# Patient Record
Sex: FEMALE | Race: WHITE | NOT HISPANIC OR LATINO | Employment: OTHER | URBAN - METROPOLITAN AREA
[De-identification: names, ages, dates, MRNs, and addresses within clinical notes are randomized per-mention and may not be internally consistent; named-entity substitution may affect disease eponyms.]

---

## 2017-08-31 ENCOUNTER — OFFICE VISIT (OUTPATIENT)
Dept: UROGYNECOLOGY | Facility: CLINIC | Age: 82
End: 2017-08-31
Payer: MEDICARE

## 2017-08-31 VITALS
SYSTOLIC BLOOD PRESSURE: 175 MMHG | BODY MASS INDEX: 20.8 KG/M2 | HEIGHT: 67 IN | DIASTOLIC BLOOD PRESSURE: 89 MMHG | TEMPERATURE: 98 F | WEIGHT: 132.5 LBS | HEART RATE: 82 BPM

## 2017-08-31 DIAGNOSIS — N39.41 URGE INCONTINENCE OF URINE: ICD-10-CM

## 2017-08-31 DIAGNOSIS — L03.315 CELLULITIS OF PERINEUM: ICD-10-CM

## 2017-08-31 DIAGNOSIS — R35.0 URINARY FREQUENCY: ICD-10-CM

## 2017-08-31 DIAGNOSIS — Z46.89 PESSARY MAINTENANCE: ICD-10-CM

## 2017-08-31 DIAGNOSIS — N81.11 MIDLINE CYSTOCELE: Primary | ICD-10-CM

## 2017-08-31 DIAGNOSIS — S30.814A VAGINAL ABRASION, INITIAL ENCOUNTER: ICD-10-CM

## 2017-08-31 LAB
BILIRUB SERPL-MCNC: ABNORMAL MG/DL
BLOOD URINE, POC: 250
COLOR, POC UA: YELLOW
GLUCOSE UR QL STRIP: ABNORMAL
KETONES UR QL STRIP: ABNORMAL
LEUKOCYTE ESTERASE URINE, POC: ABNORMAL
NITRITE, POC UA: ABNORMAL
PH, POC UA: 5
PROTEIN, POC: ABNORMAL
SPECIFIC GRAVITY, POC UA: 1.03
UROBILINOGEN, POC UA: ABNORMAL

## 2017-08-31 PROCEDURE — 99214 OFFICE O/P EST MOD 30 MIN: CPT | Mod: S$PBB,,, | Performed by: NURSE PRACTITIONER

## 2017-08-31 PROCEDURE — 3077F SYST BP >= 140 MM HG: CPT | Mod: ,,, | Performed by: NURSE PRACTITIONER

## 2017-08-31 PROCEDURE — 3079F DIAST BP 80-89 MM HG: CPT | Mod: ,,, | Performed by: NURSE PRACTITIONER

## 2017-08-31 PROCEDURE — 99214 OFFICE O/P EST MOD 30 MIN: CPT | Mod: PBBFAC,PO | Performed by: NURSE PRACTITIONER

## 2017-08-31 PROCEDURE — 99999 PR PBB SHADOW E&M-EST. PATIENT-LVL IV: CPT | Mod: PBBFAC,,, | Performed by: NURSE PRACTITIONER

## 2017-08-31 PROCEDURE — 1126F AMNT PAIN NOTED NONE PRSNT: CPT | Mod: ,,, | Performed by: NURSE PRACTITIONER

## 2017-08-31 PROCEDURE — 1159F MED LIST DOCD IN RCRD: CPT | Mod: ,,, | Performed by: NURSE PRACTITIONER

## 2017-08-31 PROCEDURE — 81002 URINALYSIS NONAUTO W/O SCOPE: CPT | Mod: PBBFAC,PO | Performed by: NURSE PRACTITIONER

## 2017-08-31 RX ORDER — OXYBUTYNIN CHLORIDE 5 MG/1
5 TABLET, EXTENDED RELEASE ORAL DAILY
Qty: 30 TABLET | Refills: 5 | Status: SHIPPED | OUTPATIENT
Start: 2017-08-31 | End: 2017-11-30

## 2017-08-31 RX ORDER — CLINDAMYCIN HYDROCHLORIDE 300 MG/1
300 CAPSULE ORAL 2 TIMES DAILY
Qty: 14 CAPSULE | Refills: 0 | Status: SHIPPED | OUTPATIENT
Start: 2017-08-31 | End: 2017-09-07

## 2017-08-31 NOTE — PROGRESS NOTES
Subjective:       Patient ID: Thu Cr is a 82 y.o. female.    Chief Complaint: Pessary Check    HPI  Thu Cr is a 82 y.o. female who is new to me, presents today for pessary problems.  She had been seen by Dr. Hernandez over a year ago on 03/21/16 and fitted with an estring and a #7 ring pessary.  Since that time she has been seeing a DrCatherine In Christopher that has been cleaning the pessary for her.  The pt is able to remove and replace the pessary herself and for the most part had been on the regiment of removing the pessary about every 4 days.  This appeared to be doing well from October through this July.  Then the Dr. She was seeing felt that the pessary was causing her irritation and wanted the pt to take the pessary out every 2 days.  The pt started doing this and this she might have scratched herself one of the times she was removing the pessary and was told she had a scratch on the right side of the vaginal wall.  The  In christopher suggested that she come to us for followup.  She has also been using the estrace cream instead of the estring because the estring is not covered by insurance.  She continues to have some urinary complaints of frequency and urgency as well.  She feels that she is voiding every 2 hours.  She does have some UUI at times.  She is wanting to try something for her OAB.      Review of Systems   Constitutional: Negative for activity change, fever and unexpected weight change.   HENT: Negative for hearing loss.    Eyes: Negative for visual disturbance.   Respiratory: Negative for shortness of breath and wheezing.    Cardiovascular: Negative for chest pain, palpitations and leg swelling.   Gastrointestinal: Negative for abdominal pain, constipation and diarrhea.   Genitourinary: Positive for frequency, urgency and vaginal bleeding. Negative for dyspareunia, dysuria and vaginal discharge.   Musculoskeletal: Negative for gait problem and neck pain.   Skin: Negative for rash and wound.    Allergic/Immunologic: Negative for immunocompromised state.   Neurological: Negative for tremors, speech difficulty and weakness.   Hematological: Does not bruise/bleed easily.   Psychiatric/Behavioral: Negative for agitation and confusion.       Objective:      Physical Exam   Constitutional: She is oriented to person, place, and time. She appears well-developed and well-nourished. No distress.   HENT:   Head: Normocephalic and atraumatic.   Neck: Neck supple. No thyromegaly present.   Pulmonary/Chest: Effort normal. No respiratory distress.   Abdominal: Soft. There is no tenderness. No hernia.   Musculoskeletal: Normal range of motion.   Neurological: She is alert and oriented to person, place, and time.   Skin: Skin is warm and dry. No rash noted.   Psychiatric: She has a normal mood and affect. Her behavior is normal.     Pelvic Exam:  V: No lesions. No palpable nodes.   Va: minimal thin white discharge.  Vaginal abrasion noted on the left vaginal wall.  Silver nitrate used to cautery.  Dominant midline cystocele Ba=0  Meatus:No caruncle or stenosis  Urethra: Non tender. No suburethral masses.  Cx/Cuff: Normal   Uterus: surgically absent  Ad: No mass or tenderness.  Levators :Symmetrical. Normal tone. Non tender.  BL: Non tender  RV: No hemorrhoids.      Assessment:       1. Midline cystocele    2. Urinary frequency    3. Urge incontinence of urine    4. Cellulitis of perineum    5. Vaginal abrasion, initial encounter    6. Pessary maintenance          Procedure note- #7 ring pessary removed and cleaned with soap and water.  After hibicleanse irrigation of the vagina, since she is ALLERGIC to iodine,  #7 ring pessary was reinserted into the vagina.  Pt ambulated in the room and denies any discomfort.  NP reminded pt that the first 24-48 hours are the most likely time for the pessary to fall out and to monitor the toilet before flushing.  Pt verbalized understanding.      Plan:       Midline cystocele- continue  with ring pessary for now.    Urinary frequency- as noted below  -     POCT urine dipstick without microscope  -     oxybutynin (DITROPAN-XL) 5 MG TR24; Take 1 tablet (5 mg total) by mouth once daily.  Dispense: 30 tablet; Refill: 5    Urge incontinence of urine- trial of ditropan    Cellulitis of perineum- as noted below  -     clindamycin (CLEOCIN) 300 MG capsule; Take 1 capsule (300 mg total) by mouth 2 (two) times daily.  Dispense: 14 capsule; Refill: 0    Vaginal abrasion, initial encounter- pt was not willing to leave the pessary out at this time. Discussed with pt that she needs to leave the pessary out for awhile to help heal the abrasion.  Pt is willing to remove the pessary Sunday night and come to an appointment on Tuesday for evaluation of the abrasion.    Pessary maintenance- monitor for now    RTC Tuesday.

## 2017-09-05 ENCOUNTER — OFFICE VISIT (OUTPATIENT)
Dept: UROGYNECOLOGY | Facility: CLINIC | Age: 82
End: 2017-09-05
Payer: MEDICARE

## 2017-09-05 VITALS
HEART RATE: 83 BPM | DIASTOLIC BLOOD PRESSURE: 94 MMHG | TEMPERATURE: 98 F | BODY MASS INDEX: 20.76 KG/M2 | HEIGHT: 67 IN | SYSTOLIC BLOOD PRESSURE: 154 MMHG | WEIGHT: 132.25 LBS

## 2017-09-05 DIAGNOSIS — L03.314 CELLULITIS OF GROIN: ICD-10-CM

## 2017-09-05 DIAGNOSIS — S30.814D VAGINAL ABRASION, SUBSEQUENT ENCOUNTER: ICD-10-CM

## 2017-09-05 DIAGNOSIS — N81.11 MIDLINE CYSTOCELE: Primary | ICD-10-CM

## 2017-09-05 DIAGNOSIS — N39.41 URGE INCONTINENCE OF URINE: ICD-10-CM

## 2017-09-05 DIAGNOSIS — Z46.89 PESSARY MAINTENANCE: ICD-10-CM

## 2017-09-05 PROCEDURE — 3080F DIAST BP >= 90 MM HG: CPT | Mod: ,,, | Performed by: NURSE PRACTITIONER

## 2017-09-05 PROCEDURE — 99214 OFFICE O/P EST MOD 30 MIN: CPT | Mod: PBBFAC,PO | Performed by: NURSE PRACTITIONER

## 2017-09-05 PROCEDURE — 1125F AMNT PAIN NOTED PAIN PRSNT: CPT | Mod: ,,, | Performed by: NURSE PRACTITIONER

## 2017-09-05 PROCEDURE — 1159F MED LIST DOCD IN RCRD: CPT | Mod: ,,, | Performed by: NURSE PRACTITIONER

## 2017-09-05 PROCEDURE — 99999 PR PBB SHADOW E&M-EST. PATIENT-LVL IV: CPT | Mod: PBBFAC,,, | Performed by: NURSE PRACTITIONER

## 2017-09-05 PROCEDURE — 99213 OFFICE O/P EST LOW 20 MIN: CPT | Mod: S$PBB,,, | Performed by: NURSE PRACTITIONER

## 2017-09-05 PROCEDURE — 3077F SYST BP >= 140 MM HG: CPT | Mod: ,,, | Performed by: NURSE PRACTITIONER

## 2017-09-05 NOTE — PROGRESS NOTES
"Subjective:       Patient ID: Thu Cr is a 82 y.o. female.    Chief Complaint: Pessary Check    HPI  Thu Cr is a 82 y.o. female who presents today for follow up on her vaginal abrasion from last week.  She removed the pessary Sunday evening and has left it out since that time.  She did notice some bleeding with removal, but has not noticed any bleeding since.  She denies any vaginal discharge at this time, but is very uncomfortable with the pessary out.  She is anxious to have the pessary replaced.  She has been taking the ditropan and has not noticed much difference in her urinary symptoms as of yet.  She also has been taking the bactrim and feels that the "bump" in the vaginal area is getting better.  She denies any other new complaints/concerns at this time.    Review of Systems   Constitutional: Negative for activity change, fever and unexpected weight change.   HENT: Negative for hearing loss.    Eyes: Negative for visual disturbance.   Respiratory: Negative for shortness of breath and wheezing.    Cardiovascular: Negative for chest pain, palpitations and leg swelling.   Gastrointestinal: Negative for abdominal pain, constipation and diarrhea.   Genitourinary: Positive for frequency and urgency. Negative for dyspareunia, dysuria, vaginal bleeding and vaginal discharge.   Musculoskeletal: Negative for gait problem and neck pain.   Skin: Negative for rash and wound.   Allergic/Immunologic: Negative for immunocompromised state.   Neurological: Negative for tremors, speech difficulty and weakness.   Hematological: Does not bruise/bleed easily.   Psychiatric/Behavioral: Negative for agitation and confusion.       Objective:      Physical Exam   Constitutional: She is oriented to person, place, and time. She appears well-developed and well-nourished. No distress.   HENT:   Head: Normocephalic and atraumatic.   Neck: Neck supple. No thyromegaly present.   Pulmonary/Chest: Effort normal. No respiratory " distress.   Abdominal: Soft. There is no tenderness. No hernia.   Musculoskeletal: Normal range of motion.   Neurological: She is alert and oriented to person, place, and time.   Skin: Skin is warm and dry. No rash noted.   Psychiatric: She has a normal mood and affect. Her behavior is normal.     Pelvic Exam:  V: No lesions. No palpable nodes.  Very small are of irritation noted on the left perineum near the 5 o'clock position.  Smaller than previous visit.  Va: no discharge or bleeding. No evidence of vaginal abrasion.  Good length.  Dominant cystocele Ba=0  Meatus:No caruncle or stenosis  Urethra: Non tender. No suburethral masses.  Cx/Cuff: Normal   Uterus: surgically absent  Ad: No mass or tenderness.  Levators :Symmetrical. Normal tone. Non tender.  BL: Non tender  RV: No hemorrhoids.      Assessment:       1. Midline cystocele    2. Vaginal abrasion, subsequent encounter    3. Pessary maintenance    4. Urge incontinence of urine    5. Cellulitis of groin          Procedure note- After Hibicleanse irrigation of the vagina since the pt is ALLERGIC to betadine, #7 ring pessary was reinserted into the vagina.  Pt ambulated in the room and denies any discomfort.  NP reminded pt that the first 24-48 hours are the most likely time for the pessary to fall out and to monitor the toilet before flushing.  Pt verbalized understanding.      Plan:       Midline cystocele- continue with ring pessary, discussed increasing the intervals between when the pt removes and replaces the pessary at home.  Encouraged pt to resume every 4 days schedule or can try increasing to 1 x a week.    Vaginal abrasion, subsequent encounter- healed at this time,  monitor    Pessary maintenance- discussed removal and replacement at home.  Also discussed how to use estrace cream    Urge incontinence of urine- continue with ditropan    Cellulitis of groin- finish antibiotics and continue using bactroban ointment for 3 more days.    RTC 3 months

## 2017-11-30 ENCOUNTER — OFFICE VISIT (OUTPATIENT)
Dept: UROGYNECOLOGY | Facility: CLINIC | Age: 82
End: 2017-11-30
Payer: MEDICARE

## 2017-11-30 VITALS
WEIGHT: 130.5 LBS | HEIGHT: 66 IN | SYSTOLIC BLOOD PRESSURE: 159 MMHG | BODY MASS INDEX: 20.97 KG/M2 | TEMPERATURE: 98 F | DIASTOLIC BLOOD PRESSURE: 93 MMHG | HEART RATE: 77 BPM

## 2017-11-30 DIAGNOSIS — N90.89 VULVAR IRRITATION: ICD-10-CM

## 2017-11-30 DIAGNOSIS — N39.41 URGE INCONTINENCE OF URINE: ICD-10-CM

## 2017-11-30 DIAGNOSIS — R35.0 URINARY FREQUENCY: ICD-10-CM

## 2017-11-30 DIAGNOSIS — Z46.89 PESSARY MAINTENANCE: ICD-10-CM

## 2017-11-30 DIAGNOSIS — N81.11 CYSTOCELE, MIDLINE: Primary | ICD-10-CM

## 2017-11-30 PROCEDURE — 99999 PR PBB SHADOW E&M-EST. PATIENT-LVL IV: CPT | Mod: PBBFAC,,, | Performed by: NURSE PRACTITIONER

## 2017-11-30 PROCEDURE — 99214 OFFICE O/P EST MOD 30 MIN: CPT | Mod: PBBFAC,PO | Performed by: NURSE PRACTITIONER

## 2017-11-30 PROCEDURE — 99213 OFFICE O/P EST LOW 20 MIN: CPT | Mod: S$PBB,,, | Performed by: NURSE PRACTITIONER

## 2017-11-30 NOTE — PROGRESS NOTES
Subjective:       Patient ID: Thu Cr is a 82 y.o. female.    Chief Complaint: Pessary Check    HPI  Thu Cr is a 82 y.o. female who presents today for routine pessary maintenance.  She feels that she has done well the past few months.  She decreased the frequency with which she had been removing the pessary at home and is now removing it about 1 x a week -10 days.  She feels that this has helped decrease her feeling of rawness.  She does occasionally have some blood with removal of the pessary, but it is not continuous and is very small amount.  She continues to have an area on the left side of the vaginal lips that is tender and feels raw.  She denies any vaginal discharge or bleeding or prolapse around the pessary.  She had tried the ditropan after her last visit on 09/05/17 but had to stop it after 30 days because she was having so many stomach issues and constipation with the medication.  She did feel that the ditropan was working very well for her urinary symptoms, but since she has stopped the medication she is back her having frequency and leakage.  She is wondering if wearing the pads is contributing to the raw sensation.  Her BM are mostly back to normal at this time.  She is wanting to try something else for her bladder if possible.    Review of Systems   Constitutional: Negative for activity change, fever and unexpected weight change.   HENT: Negative for hearing loss.    Eyes: Negative for visual disturbance.   Respiratory: Negative for shortness of breath and wheezing.    Cardiovascular: Negative for chest pain, palpitations and leg swelling.   Gastrointestinal: Negative for abdominal pain, constipation and diarrhea.   Genitourinary: Positive for frequency, urgency and vaginal pain. Negative for dyspareunia, dysuria, vaginal bleeding and vaginal discharge.   Musculoskeletal: Negative for gait problem and neck pain.   Skin: Negative for rash and wound.   Allergic/Immunologic: Negative for  immunocompromised state.   Neurological: Negative for tremors, speech difficulty and weakness.   Hematological: Does not bruise/bleed easily.   Psychiatric/Behavioral: Negative for agitation and confusion.       Objective:      Physical Exam   Constitutional: She is oriented to person, place, and time. She appears well-developed and well-nourished. No distress.   HENT:   Head: Normocephalic and atraumatic.   Neck: Neck supple. No thyromegaly present.   Pulmonary/Chest: Effort normal. No respiratory distress.   Abdominal: Soft. There is no tenderness. No hernia.   Genitourinary:         Musculoskeletal: Normal range of motion.   Neurological: She is alert and oriented to person, place, and time.   Skin: Skin is warm and dry. No rash noted.   Psychiatric: She has a normal mood and affect. Her behavior is normal.     Pelvic Exam:  V: No lesions. No palpable nodes. Small are of excoriated skin on the left side of the labia minora near the 7 o'clock position.  Va: no discharge or bleeding.  Good length.  Dominant midline cystocele Ba=-1  Meatus:No caruncle or stenosis  Urethra: Non tender. No suburethral masses.  Cx/Cuff: Normal   Uterus: small, non-tender  Ad: No mass or tenderness.  Levators :Symmetrical. Normal tone. Non tender.  BL: Non tender  RV: No hemorrhoids.      Assessment:       1. Cystocele, midline    2. Pessary maintenance    3. Urge incontinence of urine    4. Urinary frequency    5. Vulvar irritation          Procedure note- #7 ring pessary removed and cleaned with soap and water.  After HIBICLEANSE  irrigation of the vagina since the pt is ALLERGIC to BETADINE, #7 ring pessary was reinserted into the vagina.  Pt ambulated in the room and denies any discomfort.  NP reminded pt that the first 24-48 hours are the most likely time for the pessary to fall out and to monitor the toilet before flushing.  Pt verbalized understanding.      Plan:       Cystocele, midline- continue with ring pessary    Pessary  maintenance- as noted above    Urge incontinence of urine- Trial of myrbetriq 25 mg 1 tablet po daily.  If any side effects, stop the medication and call the office.    Urinary frequency- as noted above    Vulvar irritation- may use vaseline on the pads to help with irritation.  NP also recommended used of desitin to the area    RTC 2 months

## 2018-02-20 ENCOUNTER — OFFICE VISIT (OUTPATIENT)
Dept: UROGYNECOLOGY | Facility: CLINIC | Age: 83
End: 2018-02-20
Payer: MEDICARE

## 2018-02-20 VITALS
SYSTOLIC BLOOD PRESSURE: 147 MMHG | BODY MASS INDEX: 21.36 KG/M2 | RESPIRATION RATE: 18 BRPM | DIASTOLIC BLOOD PRESSURE: 89 MMHG | WEIGHT: 132.94 LBS | HEIGHT: 66 IN | HEART RATE: 86 BPM

## 2018-02-20 DIAGNOSIS — N90.89 VULVAR IRRITATION: ICD-10-CM

## 2018-02-20 DIAGNOSIS — R35.0 URINARY FREQUENCY: ICD-10-CM

## 2018-02-20 DIAGNOSIS — L02.214 GROIN ABSCESS: ICD-10-CM

## 2018-02-20 DIAGNOSIS — N81.11 CYSTOCELE, MIDLINE: Primary | ICD-10-CM

## 2018-02-20 DIAGNOSIS — Z46.89 PESSARY MAINTENANCE: ICD-10-CM

## 2018-02-20 DIAGNOSIS — N39.46 MIXED INCONTINENCE URGE AND STRESS: ICD-10-CM

## 2018-02-20 PROCEDURE — 99214 OFFICE O/P EST MOD 30 MIN: CPT | Mod: PBBFAC,PO | Performed by: NURSE PRACTITIONER

## 2018-02-20 PROCEDURE — 99999 PR PBB SHADOW E&M-EST. PATIENT-LVL IV: CPT | Mod: PBBFAC,,, | Performed by: NURSE PRACTITIONER

## 2018-02-20 PROCEDURE — 1159F MED LIST DOCD IN RCRD: CPT | Mod: ,,, | Performed by: NURSE PRACTITIONER

## 2018-02-20 PROCEDURE — 99213 OFFICE O/P EST LOW 20 MIN: CPT | Mod: S$PBB,,, | Performed by: NURSE PRACTITIONER

## 2018-02-20 PROCEDURE — 1126F AMNT PAIN NOTED NONE PRSNT: CPT | Mod: ,,, | Performed by: NURSE PRACTITIONER

## 2018-02-20 RX ORDER — CEPHALEXIN 500 MG/1
500 CAPSULE ORAL EVERY 8 HOURS
Qty: 30 CAPSULE | Refills: 0 | Status: SHIPPED | OUTPATIENT
Start: 2018-02-20 | End: 2018-02-22 | Stop reason: ALTCHOICE

## 2018-02-20 NOTE — PROGRESS NOTES
Subjective:       Patient ID: Thu Cr is a 82 y.o. female.    Chief Complaint: Follow-up pessary     HPI  Thu Cr is a 82 y.o. female who presents today for routine pessary maintenance.  She has been decreasing how often she removes and replaces the pessary herself and now takes it out about every Friday.  This seems to be working well for her.  She is also using estrace cream twice a week and desitin.  This has helped decrease her vulvar irritation.  She tried taking the myrbetriq, but after about 2 weeks she started to have some stomach upset, so she no longer wanted to take this medication.  She was unable to tolerate the ditropan for the same reason.  She currently has frequency every 2 hours during the day and night.  She has both KRISTIN and UUI.  She is not sure what else can be done at this time.  Finally, she has a boil that has appeared on the mons.  She has been soaking in epsom salt BID and also placing bactroban on the site TID.  She has had this spot for about 5 days.    Review of Systems   Constitutional: Negative for activity change, fever and unexpected weight change.   HENT: Negative for hearing loss.    Eyes: Negative for visual disturbance.   Respiratory: Negative for shortness of breath and wheezing.    Cardiovascular: Negative for chest pain, palpitations and leg swelling.   Gastrointestinal: Negative for abdominal pain, constipation and diarrhea.   Genitourinary: Positive for frequency and urgency. Negative for dyspareunia, dysuria, vaginal bleeding and vaginal discharge.   Musculoskeletal: Negative for gait problem and neck pain.   Skin: Negative for rash and wound.   Allergic/Immunologic: Negative for immunocompromised state.   Neurological: Negative for tremors, speech difficulty and weakness.   Hematological: Does not bruise/bleed easily.   Psychiatric/Behavioral: Negative for agitation and confusion.       Objective:      Physical Exam   Constitutional: She is oriented to  person, place, and time. She appears well-developed and well-nourished. No distress.   HENT:   Head: Normocephalic and atraumatic.   Neck: Neck supple. No thyromegaly present.   Pulmonary/Chest: Effort normal. No respiratory distress.   Abdominal: Soft. There is no tenderness. No hernia.   Musculoskeletal: Normal range of motion.   Neurological: She is alert and oriented to person, place, and time.   Skin: Skin is warm and dry. No rash noted.        Small red raised boil. No drainage noted   Psychiatric: She has a normal mood and affect. Her behavior is normal.     Pelvic Exam:  V: . No palpable nodes.  Small boil noted on the mons/lower abdomen.  No discharge or bleeding noted.  Mildly tender to touch  Va: small amount of thin yellow discharge.  Small amount of bleeding with removal of the pessary.  Good length  Dominant midline cystocele Ba=-1  Meatus:No caruncle or stenosis  Urethra: Non tender. No suburethral masses.  Cx/Cuff: Normal   Uterus: small, non-tender  Ad: No mass or tenderness.  Levators :Symmetrical. Normal tone. Non tender.  BL: Non tender  RV: No hemorrhoids.      Assessment:       1. Cystocele, midline    2. Pessary maintenance    3. Urinary frequency    4. Mixed incontinence urge and stress    5. Vulvar irritation    6. Groin abscess          Procedure note- #7 ring pessary removed and cleaned with soap and water.  After Hibicleanse irrigation of the vagina, since the pt is ALLERGIC to IODINE   #7 ring pessary was reinserted into the vagina.  Pt ambulated in the room and denies any discomfort.  NP reminded pt that the first 24-48 hours are the most likely time for the pessary to fall out and to monitor the toilet before flushing.  Pt verbalized understanding.      Plan:       Cystocele, midline- continue with ring pessary    Pessary maintenance- continue with ring pessary    Urinary frequency- discussed with pt that she would need a CMG, but she could consider PTNS, pelvic floor physical therapy  and or botox.  Information given on all options.    Mixed incontinence urge and stress- as noted above    Vulvar irritation- increase the estrace to 3 times a week.  Continue desitin PRN    Groin abscess- continue bactroban ointment and epsom salt soaks.  As noted below.  -     cephALEXin (KEFLEX) 500 MG capsule; Take 1 capsule (500 mg total) by mouth every 8 (eight) hours.  Dispense: 30 capsule; Refill: 0    RTC 2 months

## 2018-02-28 ENCOUNTER — TELEPHONE (OUTPATIENT)
Dept: UROGYNECOLOGY | Facility: CLINIC | Age: 83
End: 2018-02-28

## 2018-02-28 NOTE — TELEPHONE ENCOUNTER
Called and left message that I will inform Danitza Cool about the cost of the estrace and get back to her.

## 2018-02-28 NOTE — TELEPHONE ENCOUNTER
----- Message from Natalia Golden sent at 2/27/2018 10:32 AM CST -----  Contact: self [phone] or [phone]  She is calling to let you know that the estrace costs $60.  Please call her with advice.  Thank you!

## 2018-03-01 NOTE — TELEPHONE ENCOUNTER
If she can afford the $60 I would recommend it.  If she just places a fingertipful to the vaginal area three times a week, that tube should hopefully last about 3 months or longer.  If she can't afford the $60 I can send in premarin to see if this is any cheaper, but I don't know if it will be.  Those are the only two options available commercially.  We can try to contact [facility] to see if they can get it any cheaper.

## 2018-03-06 NOTE — TELEPHONE ENCOUNTER
She feels more comfortable knowing the tube will last 3 months or a little longer and is comfortable with that price now. Is requesting a rx be sent in of Estrace

## 2018-03-07 RX ORDER — ESTRADIOL 0.1 MG/G
CREAM VAGINAL
Qty: 42.5 G | Refills: 3 | Status: SHIPPED | OUTPATIENT
Start: 2018-03-07 | End: 2019-06-27

## 2018-04-19 ENCOUNTER — OFFICE VISIT (OUTPATIENT)
Dept: UROGYNECOLOGY | Facility: CLINIC | Age: 83
End: 2018-04-19
Payer: MEDICARE

## 2018-04-19 VITALS
BODY MASS INDEX: 21.11 KG/M2 | DIASTOLIC BLOOD PRESSURE: 91 MMHG | HEIGHT: 67 IN | WEIGHT: 134.5 LBS | HEART RATE: 89 BPM | SYSTOLIC BLOOD PRESSURE: 156 MMHG

## 2018-04-19 DIAGNOSIS — N39.46 MIXED INCONTINENCE URGE AND STRESS: ICD-10-CM

## 2018-04-19 DIAGNOSIS — S30.814A ABRASION OF VAGINA, INITIAL ENCOUNTER: ICD-10-CM

## 2018-04-19 DIAGNOSIS — N95.2 ATROPHIC VAGINITIS: ICD-10-CM

## 2018-04-19 DIAGNOSIS — R35.0 URINARY FREQUENCY: ICD-10-CM

## 2018-04-19 DIAGNOSIS — N81.11 CYSTOCELE, MIDLINE: Primary | ICD-10-CM

## 2018-04-19 DIAGNOSIS — Z46.89 PESSARY MAINTENANCE: ICD-10-CM

## 2018-04-19 PROCEDURE — 99213 OFFICE O/P EST LOW 20 MIN: CPT | Mod: S$PBB,,, | Performed by: NURSE PRACTITIONER

## 2018-04-19 PROCEDURE — 99999 PR PBB SHADOW E&M-EST. PATIENT-LVL IV: CPT | Mod: PBBFAC,,, | Performed by: NURSE PRACTITIONER

## 2018-04-19 PROCEDURE — 99214 OFFICE O/P EST MOD 30 MIN: CPT | Mod: PBBFAC,PO | Performed by: NURSE PRACTITIONER

## 2018-04-19 RX ORDER — MUPIROCIN 20 MG/G
OINTMENT TOPICAL
COMMUNITY
Start: 2018-04-11 | End: 2018-06-04 | Stop reason: SDUPTHER

## 2018-04-19 RX ORDER — LORAZEPAM 0.5 MG/1
TABLET ORAL
COMMUNITY
Start: 2018-02-23 | End: 2018-07-09 | Stop reason: SDUPTHER

## 2018-04-19 NOTE — PROGRESS NOTES
Subjective:       Patient ID: Thu Cr is a 82 y.o. female.    Chief Complaint: Pessary Check    HPI  Thu Cr is a 82 y.o. female who presents today for routine pessary maintenance.  She is able to remove and replace the pessary herself.  She does this every week.  She does not some bleeding after removal of the pessary for about a day.  She has been using the estrace cream 3 times a week and feels that this has helped some.  She continues to have some urinary frequency/urgency and mixed incontinence.  She has tried both the myrbetriq and ditropan and stopped them because they caused stomach upset.  She is considering trying the ditropan again to see if it will cause the same stomach upset.  She has considered the other options of PTNS, botox or pelvic floor physical therapy and is not sure she wishes to pursue any of these options at this time.    Review of Systems   Constitutional: Negative for activity change, fever and unexpected weight change.   HENT: Negative for hearing loss.    Eyes: Negative for visual disturbance.   Respiratory: Negative for shortness of breath and wheezing.    Cardiovascular: Negative for chest pain, palpitations and leg swelling.   Gastrointestinal: Negative for abdominal pain, constipation and diarrhea.   Genitourinary: Positive for frequency and urgency. Negative for dyspareunia, dysuria, vaginal bleeding and vaginal discharge.   Musculoskeletal: Negative for gait problem and neck pain.   Skin: Negative for rash and wound.   Allergic/Immunologic: Negative for immunocompromised state.   Neurological: Negative for tremors, speech difficulty and weakness.   Hematological: Does not bruise/bleed easily.   Psychiatric/Behavioral: Negative for agitation and confusion.       Objective:      Physical Exam   Constitutional: She is oriented to person, place, and time. She appears well-developed and well-nourished. No distress.   HENT:   Head: Normocephalic and atraumatic.   Neck:  Neck supple. No thyromegaly present.   Pulmonary/Chest: Effort normal. No respiratory distress.   Abdominal: Soft. There is no tenderness. No hernia.   Musculoskeletal: Normal range of motion.   Neurological: She is alert and oriented to person, place, and time.   Skin: Skin is warm and dry. No rash noted.   Psychiatric: She has a normal mood and affect. Her behavior is normal.     Pelvic Exam:  V: No lesions. No palpable nodes.   Va: no discharge.  Active bleeding noted on the anterior vaginal wall.  Area cauterized with silver nitrate.  Pessary left out.  Dominant midline cystocele Ba=0  Meatus:No caruncle or stenosis  Urethra: Non tender. No suburethral masses.  Cx/Cuff: Normal   Uterus: small, non-tender  Ad: No mass or tenderness.  Levators :Symmetrical. Normal tone. Non tender.  BL: Non tender  RV: No hemorrhoids.      Assessment:       1. Cystocele, midline    2. Pessary maintenance    3. Urinary frequency    4. Mixed incontinence urge and stress    5. Atrophic vaginitis    6. Abrasion of vagina, initial encounter          Procedure note- #7 ring pessary removed and cleaned with soap and water.  Pessary sent home with pt.  Pt will replace the pessary in about a weeks time.  Pt is not sure if she will be able to leave the pessary out for a full week because the prolapse becomes too irritated.      Plan:       Cystocele, midline- we will leave the pessary out at this time.    Pessary maintenance- as noted above    Urinary frequency- consider resuming ditropan    Mixed incontinence urge and stress- as noted above    Atrophic vaginitis- continue estrace three times a week.    Abrasion of vagina, initial encounter- area cauterized with silver nitrate.  Pessary left out at this time.  Pt is able to replace the pessary and will do so at home so that she does not have a return office visit since she has a far drive.  If she has any concerns or problems with this she will call the office.    RTC 2 months or  PRN

## 2018-04-26 ENCOUNTER — OFFICE VISIT (OUTPATIENT)
Dept: UROGYNECOLOGY | Facility: CLINIC | Age: 83
End: 2018-04-26
Payer: MEDICARE

## 2018-04-26 VITALS
HEART RATE: 75 BPM | SYSTOLIC BLOOD PRESSURE: 162 MMHG | BODY MASS INDEX: 21.14 KG/M2 | DIASTOLIC BLOOD PRESSURE: 91 MMHG | HEIGHT: 67 IN | WEIGHT: 134.69 LBS

## 2018-04-26 DIAGNOSIS — N95.2 ATROPHIC VAGINITIS: ICD-10-CM

## 2018-04-26 DIAGNOSIS — R35.0 URINARY FREQUENCY: ICD-10-CM

## 2018-04-26 DIAGNOSIS — N81.11 CYSTOCELE, MIDLINE: Primary | ICD-10-CM

## 2018-04-26 DIAGNOSIS — S30.814D ABRASION OF VAGINA, SUBSEQUENT ENCOUNTER: ICD-10-CM

## 2018-04-26 DIAGNOSIS — Z46.89 PESSARY MAINTENANCE: ICD-10-CM

## 2018-04-26 DIAGNOSIS — N39.46 MIXED INCONTINENCE URGE AND STRESS: ICD-10-CM

## 2018-04-26 LAB
BILIRUB SERPL-MCNC: ABNORMAL MG/DL
BLOOD URINE, POC: ABNORMAL
COLOR, POC UA: ABNORMAL
GLUCOSE UR QL STRIP: ABNORMAL
KETONES UR QL STRIP: ABNORMAL
LEUKOCYTE ESTERASE URINE, POC: ABNORMAL
NITRITE, POC UA: ABNORMAL
PH, POC UA: 5
PROTEIN, POC: ABNORMAL
SPECIFIC GRAVITY, POC UA: 1.02
UROBILINOGEN, POC UA: ABNORMAL

## 2018-04-26 PROCEDURE — 99999 PR PBB SHADOW E&M-EST. PATIENT-LVL IV: CPT | Mod: PBBFAC,,, | Performed by: NURSE PRACTITIONER

## 2018-04-26 PROCEDURE — 81002 URINALYSIS NONAUTO W/O SCOPE: CPT | Mod: PBBFAC,PO | Performed by: NURSE PRACTITIONER

## 2018-04-26 PROCEDURE — 99214 OFFICE O/P EST MOD 30 MIN: CPT | Mod: PBBFAC,PO | Performed by: NURSE PRACTITIONER

## 2018-04-26 PROCEDURE — 99213 OFFICE O/P EST LOW 20 MIN: CPT | Mod: S$PBB,,, | Performed by: NURSE PRACTITIONER

## 2018-04-26 NOTE — PROGRESS NOTES
Subjective:       Patient ID: Thu Cr is a 82 y.o. female.    Chief Complaint: Wound Check (pessary was out on last thursday  was placed back in on sunday)    HPI  Thu Cr is a 82 y.o. female who presents today for follow up in regards to her vaginal abrasion.  When she was here last week she had a vaginal abrasion from the pessary.  The pessary was left out at that time to help the area heal.  However, it was very uncomfortable for the patient on Friday and Saturday.  She did not feel that she could void and she had to push the prolapse back up in order to void or have a BM.  She was hoping that taking a bath would help alleviate her discomfort.  When she got in the bathtub she started having a stinging sensation and when she got out of the tub, she noticed a large amount of bleeding on the prolapse.  She ended up placing the pessary herself Sunday at 1 am.  She has not had any bleeding since and the irritative symptoms have improved, but she describes it as just a sore sensation.  She denies any urinary complaints.  She is very upset at this time and does not know what the next best step would be.  She does not wish to continue with the pessary, but she is nervous about going for surgical intervention.  She has too much irritation and difficulties when the pessary is left out.  She would like to speak with Dr. Hernandez about surgery.    Review of Systems   Constitutional: Negative for activity change, fever and unexpected weight change.   HENT: Negative for hearing loss.    Eyes: Negative for visual disturbance.   Respiratory: Negative for shortness of breath and wheezing.    Cardiovascular: Negative for chest pain, palpitations and leg swelling.   Gastrointestinal: Negative for abdominal pain, constipation and diarrhea.   Genitourinary: Positive for difficulty urinating, frequency, urgency, vaginal bleeding and vaginal pain. Negative for dyspareunia, dysuria and vaginal discharge.   Musculoskeletal:  Negative for gait problem and neck pain.   Skin: Negative for rash and wound.   Allergic/Immunologic: Negative for immunocompromised state.   Neurological: Negative for tremors, speech difficulty and weakness.   Hematological: Does not bruise/bleed easily.   Psychiatric/Behavioral: Negative for agitation and confusion.       Objective:      Physical Exam   Constitutional: She is oriented to person, place, and time. She appears well-developed and well-nourished. No distress.   HENT:   Head: Normocephalic and atraumatic.   Neck: Neck supple. No thyromegaly present.   Pulmonary/Chest: Effort normal. No respiratory distress.   Abdominal: Soft. There is no tenderness. No hernia.   Musculoskeletal: Normal range of motion.   Neurological: She is alert and oriented to person, place, and time.   Skin: Skin is warm and dry. No rash noted.   Psychiatric: She has a normal mood and affect. Her behavior is normal.   Slightly tearful and anxious about her options.     Pelvic Exam:  V: No lesions. No palpable nodes.   Va: no discharge.  Large area of irritation noted on the anterior vaginal wall.  No areas of active bleeding noted.  Dominant midline cystocele Ba=0  Meatus:No caruncle or stenosis  Urethra: Non tender. No suburethral masses.  Cx/Cuff: Normal   Uterus: small, non tender, with elevation of the bladder, mild descent to -3  Ad: No mass or tenderness.  Levators :Symmetrical. Normal tone. Non tender.  BL: Non tender  RV: No hemorrhoids.      Assessment:       1. Cystocele, midline    2. Pessary maintenance    3. Abrasion of vagina, subsequent encounter    4. Urinary frequency    5. Mixed incontinence urge and stress    6. Atrophic vaginitis          Procedure note- #7 ring pessary removed and cleaned with soap and water.  After hibicleanse irrigation of the vagina, since the pt is ALLERGIC to IODINE, #7 pessary was reinserted into the vagina.  Pt ambulated in the room and denies any discomfort.  NP reminded pt that the  first 24-48 hours are the most likely time for the pessary to fall out and to monitor the toilet before flushing.  Pt verbalized understanding.      Plan:       Cystocele, midline- continue with ring pessary for now,  Will meet with Dr. Hernandez to discuss her options    Pessary maintenance- as noted above    Abrasion of vagina, subsequent encounter- fair amount of vaginal irritation remains. When the pessary is left out the pt gets irritation and bleeding on the prolapse and more voiding problems so we discussed replacing the pessary at this time.    Urinary frequency- monitor    Mixed incontinence urge and stress- monitor    Atrophic vaginitis- continue with estrace cream    RTC 2 weeks with me for CMG and 3 weeks with Dr. Hernandez to discuss her options.

## 2018-05-10 ENCOUNTER — OFFICE VISIT (OUTPATIENT)
Dept: UROGYNECOLOGY | Facility: CLINIC | Age: 83
End: 2018-05-10
Payer: MEDICARE

## 2018-05-10 VITALS
HEART RATE: 73 BPM | SYSTOLIC BLOOD PRESSURE: 171 MMHG | BODY MASS INDEX: 20.9 KG/M2 | HEIGHT: 67 IN | DIASTOLIC BLOOD PRESSURE: 93 MMHG | WEIGHT: 133.19 LBS

## 2018-05-10 DIAGNOSIS — N39.46 MIXED INCONTINENCE URGE AND STRESS: Primary | ICD-10-CM

## 2018-05-10 DIAGNOSIS — N95.2 ATROPHIC VAGINITIS: ICD-10-CM

## 2018-05-10 DIAGNOSIS — N81.11 CYSTOCELE, MIDLINE: ICD-10-CM

## 2018-05-10 DIAGNOSIS — R35.0 URINARY FREQUENCY: ICD-10-CM

## 2018-05-10 LAB
BILIRUB SERPL-MCNC: NEGATIVE MG/DL
BLOOD URINE, POC: NORMAL
COLOR, POC UA: NORMAL
GLUCOSE UR QL STRIP: NORMAL
KETONES UR QL STRIP: NEGATIVE
LEUKOCYTE ESTERASE URINE, POC: NORMAL
NITRITE, POC UA: NEGATIVE
PH, POC UA: 5
PROTEIN, POC: NORMAL
SPECIFIC GRAVITY, POC UA: 1.03
UROBILINOGEN, POC UA: NORMAL

## 2018-05-10 PROCEDURE — 99214 OFFICE O/P EST MOD 30 MIN: CPT | Mod: 25,S$PBB,, | Performed by: NURSE PRACTITIONER

## 2018-05-10 PROCEDURE — 51725 SIMPLE CYSTOMETROGRAM: CPT | Mod: 26,S$PBB,, | Performed by: NURSE PRACTITIONER

## 2018-05-10 PROCEDURE — 81002 URINALYSIS NONAUTO W/O SCOPE: CPT | Mod: PBBFAC,PO | Performed by: NURSE PRACTITIONER

## 2018-05-10 PROCEDURE — 99999 PR PBB SHADOW E&M-EST. PATIENT-LVL IV: CPT | Mod: PBBFAC,,, | Performed by: NURSE PRACTITIONER

## 2018-05-10 PROCEDURE — 51725 SIMPLE CYSTOMETROGRAM: CPT | Mod: PBBFAC,PO | Performed by: NURSE PRACTITIONER

## 2018-05-10 PROCEDURE — 99214 OFFICE O/P EST MOD 30 MIN: CPT | Mod: PBBFAC,PO,25 | Performed by: NURSE PRACTITIONER

## 2018-05-10 NOTE — PROGRESS NOTES
Subjective:       Patient ID: Thu Cr is a 82 y.o. female.    Chief Complaint: CMG    HPI  Thu Cr is a 82 y.o. female who presents today for CMG.  She has been using a pessary for awhile, but has started developing irritation from the pessary.  When the pessary is left out she is extremely uncomfortable, has difficulty voiding and ends up with irritation on the prolapse because it is so pronounced.  She is not sure if she wants to have surgical correction, but she is no longer happy with the pessary either.  She is meeting with Dr. Hernandez to discuss her options next week and we are performing a CMG at this time to help provide information in regards to the possible surgical intervention.      Review of Systems   Constitutional: Negative for activity change, fever and unexpected weight change.   HENT: Negative for hearing loss.    Eyes: Negative for visual disturbance.   Respiratory: Negative for shortness of breath and wheezing.    Cardiovascular: Negative for chest pain, palpitations and leg swelling.   Gastrointestinal: Negative for abdominal pain, constipation and diarrhea.   Genitourinary: Positive for frequency and urgency. Negative for dyspareunia, dysuria, vaginal bleeding and vaginal discharge.   Musculoskeletal: Negative for gait problem and neck pain.   Skin: Negative for rash and wound.   Allergic/Immunologic: Negative for immunocompromised state.   Neurological: Negative for tremors, speech difficulty and weakness.   Hematological: Does not bruise/bleed easily.   Psychiatric/Behavioral: Negative for agitation and confusion.       Objective:      Physical Exam   Constitutional: She is oriented to person, place, and time. She appears well-developed and well-nourished. No distress.   HENT:   Head: Normocephalic and atraumatic.   Neck: Neck supple. No thyromegaly present.   Pulmonary/Chest: Effort normal. No respiratory distress.   Abdominal: Soft. There is no tenderness. No hernia.    Musculoskeletal: Normal range of motion.   Neurological: She is alert and oriented to person, place, and time.   Skin: Skin is warm and dry. No rash noted.   Psychiatric: She has a normal mood and affect. Her behavior is normal.     Pelvic Exam:  V: No lesions. No palpable nodes. Labia majora and minora slightly irritated.  Va: we are leaving the pessary in place at this time to help minimize irritation.  No bulging around the pessary noted.  Meatus:No caruncle or stenosis  Urethra: Non tender. No suburethral masses.  Uterus: pessary in place  Ad: No mass or tenderness.  BL: Non tender  RV: No hemorrhoids.      Assessment:       1. Mixed incontinence urge and stress    2. Urinary frequency    3. Cystocele, midline    4. Atrophic vaginitis          Procedure note- CMG- Pessary left in place prior to beginning CMG.  After betadine irrigation of the urethra, lidocaine instilled via urojet.  A #8 [place] catheter inserted into the bladder.  30 mls of urine withdrawn.  The catheter was then attached to sterile water and the water was allowed to flow into the bladder.  >40 cm of water closure pressure noted.  The pt was then asked to stand.  First sensation was at approx 250 mls.  Total bladder capacity was approx 400 mls.  The catheter was then withdrawn and pt had small amount of leakage with the removal.    Pt asked to cough multiple timesand had some incontinence.  Pt then allowed to ambulate to the restroom.  Pt had no incontinence while ambulating and waiting for the restroom.  Plan:       Mixed incontinence urge and stress- NP will review CMG results with Dr. Hernandez  -     POCT URINE DIPSTICK WITHOUT MICROSCOPE    Urinary frequency- as noted above    Cystocele, midline- as noted above    Atrophic vaginitis- continue estrace 3 times a week    RTC reg sched appt with Dr. Hernandez

## 2018-05-15 ENCOUNTER — OFFICE VISIT (OUTPATIENT)
Dept: UROGYNECOLOGY | Facility: CLINIC | Age: 83
End: 2018-05-15
Payer: MEDICARE

## 2018-05-15 VITALS
DIASTOLIC BLOOD PRESSURE: 89 MMHG | HEIGHT: 67 IN | SYSTOLIC BLOOD PRESSURE: 164 MMHG | WEIGHT: 132.69 LBS | BODY MASS INDEX: 20.83 KG/M2 | HEART RATE: 75 BPM

## 2018-05-15 DIAGNOSIS — N81.10 CYSTOCELE WITH RECTOCELE: ICD-10-CM

## 2018-05-15 DIAGNOSIS — N39.46 MIXED INCONTINENCE URGE AND STRESS: Primary | ICD-10-CM

## 2018-05-15 DIAGNOSIS — N81.6 CYSTOCELE WITH RECTOCELE: ICD-10-CM

## 2018-05-15 DIAGNOSIS — N81.4 UTERINE PROLAPSE: ICD-10-CM

## 2018-05-15 DIAGNOSIS — R35.0 URINARY FREQUENCY: ICD-10-CM

## 2018-05-15 PROCEDURE — 99213 OFFICE O/P EST LOW 20 MIN: CPT | Mod: PBBFAC,PO | Performed by: OBSTETRICS & GYNECOLOGY

## 2018-05-15 PROCEDURE — 99999 PR PBB SHADOW E&M-EST. PATIENT-LVL III: CPT | Mod: PBBFAC,,, | Performed by: OBSTETRICS & GYNECOLOGY

## 2018-05-15 PROCEDURE — 99213 OFFICE O/P EST LOW 20 MIN: CPT | Mod: S$PBB,,, | Performed by: OBSTETRICS & GYNECOLOGY

## 2018-05-15 NOTE — PROGRESS NOTES
Subjective:     Chief Complaint: discuss surgery  History of Present Illness: Thu Cr is a 82 y.o. female who presents for possible surgery.  she has been using the pessary successfully but sometimes she has to remove it because of irritation and bleeding.  When she wears it she has no discomfort and says she can't notice it's there.  However when she has to remove it she has pain while it out.  She is able to place it herself but only does so every other week.  She's not certain if she wants surgery but she does not want to wait until she stopped all.  She is also bothered by mixed incontinence and question whether this would be improved with surgery    Review of Systems    Constitutional: No acute distress. No weight gain/loss.  Eyes: cataracts  ENT: No headaches.   Respiratory: COPD  Cardiovascular: No chest pain. No edema. Hypertension  Gastrointestinal: GERD  Genitourinary: No vaginal bleeding or discharge.  Integument/Breast: Negative  Hematologic/Lymphatic: No history of anemia.  Musculoskeletal:  back pain. No abdominal pain.  Neurological: No disc problems. No paresthesias.  Behavioral/Psych: No history of depression.   Endocrine: thyroid hormonal replacement.  Allergy/Immune: no recent reactions     Objective:   General Exam:  General appearance: WDNF. NAD.   HEENT: Sarah. EOM's intact.  Neck: Normal thyroid.   Back: No CVA tenderness.  RESP: No SOB.  Breasts: deferred  Abdomen: Benign without localizing signs.  Extremities: No edema. No varices.  Lymphatic: noncontributory  Skin: No rashes. No lesions.  Neurologic: Intact.   Psych: Oriented.       Assessment:   We discussed options including surgery versus daily and removal of  pessary to avoid irritation       Plan:      She will try removing the pessary daily for the next 2-3 weeks well call us and let us know if she wished to proceed to surgery or she is comfortable continuing using the pessary

## 2018-06-04 ENCOUNTER — TELEPHONE (OUTPATIENT)
Dept: UROGYNECOLOGY | Facility: CLINIC | Age: 83
End: 2018-06-04

## 2018-06-04 NOTE — TELEPHONE ENCOUNTER
Spoke with pt.  States doing as advised per Dr. Hernandez , taking pessary out every 3 days and putting back in, no pain but has some bleeding.  Informed can work her in with BEBETO Cool NP, will speak with daughter to see what time is convenient for her to bring her and call back.

## 2018-06-04 NOTE — TELEPHONE ENCOUNTER
----- Message from Kylah Ortega sent at 6/4/2018  8:32 AM CDT -----  Contact: self   Patient want to speak with a nurse regarding passing blood and want to see if she need to come in please call back at [phone] or [phone]

## 2018-06-13 ENCOUNTER — OFFICE VISIT (OUTPATIENT)
Dept: UROGYNECOLOGY | Facility: CLINIC | Age: 83
End: 2018-06-13
Payer: MEDICARE

## 2018-06-13 VITALS
BODY MASS INDEX: 21.07 KG/M2 | DIASTOLIC BLOOD PRESSURE: 98 MMHG | WEIGHT: 134.25 LBS | SYSTOLIC BLOOD PRESSURE: 169 MMHG | HEART RATE: 80 BPM | HEIGHT: 67 IN

## 2018-06-13 DIAGNOSIS — S30.814A ABRASION OF VAGINA, INITIAL ENCOUNTER: ICD-10-CM

## 2018-06-13 DIAGNOSIS — N93.9 VAGINAL BLEEDING: Primary | ICD-10-CM

## 2018-06-13 DIAGNOSIS — N81.89 PELVIC RELAXATION: ICD-10-CM

## 2018-06-13 PROCEDURE — 99213 OFFICE O/P EST LOW 20 MIN: CPT | Mod: PBBFAC,PO | Performed by: OBSTETRICS & GYNECOLOGY

## 2018-06-13 PROCEDURE — 99213 OFFICE O/P EST LOW 20 MIN: CPT | Mod: S$PBB,,, | Performed by: OBSTETRICS & GYNECOLOGY

## 2018-06-13 PROCEDURE — 99999 PR PBB SHADOW E&M-EST. PATIENT-LVL III: CPT | Mod: PBBFAC,,, | Performed by: OBSTETRICS & GYNECOLOGY

## 2018-06-13 NOTE — PROGRESS NOTES
Subjective:     Chief Complaint: vaginal spotting  History of Present Illness: Thu Cr is a 82 y.o. female who presents for vaginal spotting.  She had been removing her ring pessary every other night for a while but then went to 5 days.  She is now noticing some bleeding or spotting was minimal discharge or odor.  She complains of some external changes and is worried that she has staph infection because she had a problem with this in the past.  She has difficulty replacing the pessary sometimes because it's too slippery when she puts jelly on her fingertips.  She was instructed to jelly on the tip of the pessary not her fingertips.  She is concerned about leaving it out because she says it's too uncomfortable when she does not have in place    Review of Systems    Constitutional: No acute distress. No weight gain/loss.  Eyes: cataracts  ENT: No headaches.   Respiratory: COPD  Cardiovascular:  Hypertension  Gastrointestinal: GERD  Genitourinary: No vaginal bleeding or discharge.  Integument/Breast: Negative  Hematologic/Lymphatic: B12 deficiency  Musculoskeletal: chronic back pain. No abdominal pain.  Neurological: No disc problems. No paresthesias.  Behavioral/Psych: No history of depression.   Endocrine: thyroid hormonal replacement.  Allergy/Immune: no recent reactions     Objective:   General Exam:  General appearance: WDNF. NAD.   HEENT: Sarah. EOM's intact.  Neck: Normal thyroid.   Back: No CVA tenderness.  Mild kyphosis  RESP: No SOB.  Breasts: deferred  Abdomen: Benign without localizing signs.  Extremities: A few scattered ecchymoses.  Lymphatic: noncontributory  Skin: No rashes. No lesions.  Neurologic: Intact.   Psych: Oriented.   Pelvic Exam:  V:  No lesions. No palpable nodes.   Va:No d/c bleeding or lesions.   .Meatus:No caruncle or stenosis  Urethra: Non tender. No suburethral masses.  Cx/Cuff: Normal   Uterus: Surgically absent.  Ad: No mass or tenderness.  Levators :Symmetrical. Normal tone.  Non tender.  BL: Non tender  RV: No hemorrhoids.  Assessment:   Small abrasion due to pressure from the ring pessary   Procedure note ; pessary cleaning ; ring pessary was removed without difficulty after irrigation with Hibiclens, there was noted to be slight apical abrasion with no actual ulceration   Plan:      Keep the pessary out for at least 24 hours and then for the next week, remove it every night; if the bleeding resolves will then go to every other night

## 2018-08-20 ENCOUNTER — OFFICE VISIT (OUTPATIENT)
Dept: UROGYNECOLOGY | Facility: CLINIC | Age: 83
End: 2018-08-20
Payer: MEDICARE

## 2018-08-20 VITALS
BODY MASS INDEX: 20.59 KG/M2 | DIASTOLIC BLOOD PRESSURE: 91 MMHG | HEIGHT: 67 IN | WEIGHT: 131.19 LBS | HEART RATE: 79 BPM | SYSTOLIC BLOOD PRESSURE: 158 MMHG

## 2018-08-20 DIAGNOSIS — N76.5 VAGINAL ULCERATION: ICD-10-CM

## 2018-08-20 DIAGNOSIS — R35.0 URINARY FREQUENCY: Primary | ICD-10-CM

## 2018-08-20 DIAGNOSIS — N81.89 PELVIC RELAXATION: ICD-10-CM

## 2018-08-20 DIAGNOSIS — R35.1 NOCTURIA: ICD-10-CM

## 2018-08-20 LAB
BILIRUB SERPL-MCNC: ABNORMAL MG/DL
BLOOD URINE, POC: ABNORMAL
COLOR, POC UA: YELLOW
GLUCOSE UR QL STRIP: ABNORMAL
KETONES UR QL STRIP: ABNORMAL
LEUKOCYTE ESTERASE URINE, POC: ABNORMAL
NITRITE, POC UA: ABNORMAL
PH: 5
PROTEIN, POC: ABNORMAL
SPECIFIC GRAVITY, POC UA: 1.02
UROBILINOGEN, POC UA: ABNORMAL

## 2018-08-20 PROCEDURE — 99999 PR PBB SHADOW E&M-EST. PATIENT-LVL III: CPT | Mod: PBBFAC,,, | Performed by: OBSTETRICS & GYNECOLOGY

## 2018-08-20 PROCEDURE — 81002 URINALYSIS NONAUTO W/O SCOPE: CPT | Mod: PBBFAC,PO | Performed by: OBSTETRICS & GYNECOLOGY

## 2018-08-20 PROCEDURE — 99213 OFFICE O/P EST LOW 20 MIN: CPT | Mod: S$PBB,,, | Performed by: OBSTETRICS & GYNECOLOGY

## 2018-08-20 PROCEDURE — 99213 OFFICE O/P EST LOW 20 MIN: CPT | Mod: PBBFAC,PO | Performed by: OBSTETRICS & GYNECOLOGY

## 2018-08-20 NOTE — PROGRESS NOTES
Subjective:     Chief Complaint:  Pessary management  History of Present Illness: Thu Cr is a 83 y.o. female who presents for pessary management.  She was previously seen for an ulceration.  She began to remove the pessary every night for a week and then every other night.  She occasionally see some spotting but no significant discharge.  She is able to remove it and replace it on her own.  She has some incontinence bedtime when she gets to go to the bathroom and wears pad because of this notices sometimes there is some spotting on pad.  She has had no recent bleeding.  She has had no vaginal infections or UTIs.  Her daughter questioned whether she would ever need surgery for this condition.  The patient would like to continue with the pessary as long as possible    Review of Systems    Constitutional: No acute distress. No weight gain/loss.  Eyes: No vision changes.  ENT: No headaches.   Respiratory: COPD  Cardiovascular: No chest pain. No edema. Hypertension  Gastrointestinal: GERD.   Genitourinary: No vaginal bleeding or discharge.  Integument/Breast: Negative  Hematologic/Lymphatic: No history of anemia.  Musculoskeletal: No back pain. No abdominal pain.  Neurological: No disc problems. No paresthesias.  Behavioral/Psych: No history of depression.   Endocrine: No hormonal replacement.  Allergy/Immune: no recent reactions     Objective:   General Exam:  General appearance: WDNF. NAD.   HEENT: Sarah. EOM's intact.  Neck: Normal thyroid.   Back: No CVA tenderness.  Kyphosis  RESP: No SOB.  Breasts: deferred  Abdomen: Benign without localizing signs.  Extremities:  varices.  Lymphatic: noncontributory  Skin: No rashes. No lesions.  Neurologic: Intact.   Psych: Oriented.   Pelvic Exam:  V:  No lesions. No palpable nodes.  Mild erythema  Va:  Pessary fits well.  Healed abrasions with no active bleeding or ulcerations  .Meatus:No caruncle or stenosis  Urethra: Non tender. No suburethral masses.  Cx/Cuff: Normal    Uterus: Surgically absent.  Ad: No mass or tenderness.  Levators :Symmetrical. Normal tone. Non tender.  BL: Non tender  RV: No hemorrhoids.  Assessment:   Does well with the pessary is long she changes it on a frequent basis       Plan:      Because of her age and the success she has had with the pessary we will continue trying to treat her conservatively

## 2019-01-21 ENCOUNTER — OFFICE VISIT (OUTPATIENT)
Dept: UROGYNECOLOGY | Facility: CLINIC | Age: 84
End: 2019-01-21
Payer: MEDICARE

## 2019-01-21 VITALS
HEIGHT: 67 IN | HEART RATE: 82 BPM | DIASTOLIC BLOOD PRESSURE: 78 MMHG | WEIGHT: 132.69 LBS | SYSTOLIC BLOOD PRESSURE: 138 MMHG | BODY MASS INDEX: 20.83 KG/M2

## 2019-01-21 DIAGNOSIS — N39.8 VOIDING DYSFUNCTION: ICD-10-CM

## 2019-01-21 DIAGNOSIS — N93.9 VAGINAL BLEEDING: Primary | ICD-10-CM

## 2019-01-21 DIAGNOSIS — N89.8 GRANULATION TISSUE OF VAGINA: ICD-10-CM

## 2019-01-21 DIAGNOSIS — N81.89 PELVIC RELAXATION: ICD-10-CM

## 2019-01-21 PROCEDURE — 99213 OFFICE O/P EST LOW 20 MIN: CPT | Mod: S$PBB,,, | Performed by: OBSTETRICS & GYNECOLOGY

## 2019-01-21 PROCEDURE — 99213 OFFICE O/P EST LOW 20 MIN: CPT | Mod: PBBFAC,PO | Performed by: OBSTETRICS & GYNECOLOGY

## 2019-01-21 PROCEDURE — 99999 PR PBB SHADOW E&M-EST. PATIENT-LVL III: ICD-10-PCS | Mod: PBBFAC,,, | Performed by: OBSTETRICS & GYNECOLOGY

## 2019-01-21 PROCEDURE — 99999 PR PBB SHADOW E&M-EST. PATIENT-LVL III: CPT | Mod: PBBFAC,,, | Performed by: OBSTETRICS & GYNECOLOGY

## 2019-01-21 PROCEDURE — 99213 PR OFFICE/OUTPT VISIT, EST, LEVL III, 20-29 MIN: ICD-10-PCS | Mod: S$PBB,,, | Performed by: OBSTETRICS & GYNECOLOGY

## 2019-01-21 NOTE — PROGRESS NOTES
Subjective:     Chief Complaint:  Vaginal bleeding  History of Present Illness: Thu Cr is a 83 y.o. female who presents for vaginal bleeding.  She has a ring pessary and in the past has had some irritation which healed when it was removed.  She removes it every other night but says she has bleeding when she does.  She is very concerned about the possibility of leaving it out because within a day her bladder prolapses so much that she can't urinate.  A gets irritated and sometimes bleeds when she tries to push it back up to void.  She recently had a knee replacement which is limiting her mobility.  She is using topical estrogen cream    Review of Systems    Constitutional: No acute distress. No weight gain/loss.  Eyes:  Cataracts  ENT: No headaches.   Respiratory: No SOB.  Cardiovascular: No chest pain. No edema. Hypertension  Gastrointestinal:  GERD.   Genitourinary:  vaginal bleeding   Integument/Breast: Negative  Hematologic/Lymphatic: No history of anemia.  Musculoskeletal:  Recent knee replacement  Neurological: No major disc problems. No paresthesias.  Behavioral/Psych: No history of depression.   Endocrine:  Thyroid hormonal replacement.  Allergy/Immune: no recent reactions     Objective:   General Exam:  General appearance: WDNF. NAD.   HEENT: Sarah. EOM's intact.  Neck: Normal thyroid.   Back: No CVA tenderness.  RESP: No SOB.  Breasts: deferred  Abdomen: Benign without localizing signs.  Extremities:  Right knee surgery healing well  Lymphatic: noncontributory  Skin:  Very thin but not friable.  Neurologic: Intact.   Psych: Oriented.   Pelvic Exam:  V:  No lesions. No palpable nodes.   Va:No d/c bleeding or lesions.  Diffuse relaxation with apical posterior granulation tissue.  The ring pessary seems to fit well.  Granulation tissue was cauterized and Hibiclens was used to irrigate the vagina  .Meatus:No caruncle or stenosis  Urethra: Non tender. No suburethral masses.  Cx/Cuff: Normal   Uterus:  No  abnormalities palpated  Ad: No mass or tenderness.  Levators :Symmetrical. Normal tone. Non tender.  BL: Non tender  RV:  hemorrhoids.  Assessment:   Granulation tissue due to pressure from the pessary.  She feels comfortable with the pessary and said is quite painful and difficult to void when is out.       Plan:    Encouraged to try to leave it out for at least a week.  She does not think she will be able to so if she cannot and is too uncomfortable with it out, she will return to have a smaller ring pessary placed in the hopes that the granulation tissue will heal.  Otherwise she will return next week hopefully to replace her regular pessary

## 2019-02-04 ENCOUNTER — OFFICE VISIT (OUTPATIENT)
Dept: UROGYNECOLOGY | Facility: CLINIC | Age: 84
End: 2019-02-04
Payer: MEDICARE

## 2019-02-04 VITALS
SYSTOLIC BLOOD PRESSURE: 150 MMHG | BODY MASS INDEX: 20.35 KG/M2 | HEART RATE: 81 BPM | DIASTOLIC BLOOD PRESSURE: 82 MMHG | HEIGHT: 67 IN | WEIGHT: 129.63 LBS

## 2019-02-04 DIAGNOSIS — N81.89 PELVIC RELAXATION: ICD-10-CM

## 2019-02-04 DIAGNOSIS — R35.0 URINARY FREQUENCY: Primary | ICD-10-CM

## 2019-02-04 DIAGNOSIS — N93.9 VAGINAL BLEEDING: ICD-10-CM

## 2019-02-04 DIAGNOSIS — N76.1 SUBACUTE VAGINITIS: ICD-10-CM

## 2019-02-04 PROCEDURE — 99213 OFFICE O/P EST LOW 20 MIN: CPT | Mod: PBBFAC,PO | Performed by: OBSTETRICS & GYNECOLOGY

## 2019-02-04 PROCEDURE — 81002 URINALYSIS NONAUTO W/O SCOPE: CPT | Mod: PBBFAC,PO | Performed by: OBSTETRICS & GYNECOLOGY

## 2019-02-04 PROCEDURE — 99213 OFFICE O/P EST LOW 20 MIN: CPT | Mod: S$PBB,,, | Performed by: OBSTETRICS & GYNECOLOGY

## 2019-02-04 PROCEDURE — 99999 PR PBB SHADOW E&M-EST. PATIENT-LVL III: CPT | Mod: PBBFAC,,, | Performed by: OBSTETRICS & GYNECOLOGY

## 2019-02-04 PROCEDURE — 99213 PR OFFICE/OUTPT VISIT, EST, LEVL III, 20-29 MIN: ICD-10-PCS | Mod: S$PBB,,, | Performed by: OBSTETRICS & GYNECOLOGY

## 2019-02-04 PROCEDURE — 99999 PR PBB SHADOW E&M-EST. PATIENT-LVL III: ICD-10-PCS | Mod: PBBFAC,,, | Performed by: OBSTETRICS & GYNECOLOGY

## 2019-02-04 RX ORDER — METRONIDAZOLE 10 MG/G
GEL TOPICAL
COMMUNITY
Start: 2017-07-26 | End: 2019-06-27

## 2019-02-04 RX ORDER — ZOLPIDEM TARTRATE 10 MG/1
TABLET ORAL
COMMUNITY
Start: 2015-06-04 | End: 2019-05-09 | Stop reason: SDUPTHER

## 2019-02-04 RX ORDER — HYDROCODONE BITARTRATE AND ACETAMINOPHEN 5; 325 MG/1; MG/1
1 TABLET ORAL
COMMUNITY
Start: 2016-10-14 | End: 2019-04-22

## 2019-02-04 RX ORDER — ESTRADIOL 0.1 MG/G
1 CREAM VAGINAL
COMMUNITY
Start: 2017-07-10 | End: 2019-06-27

## 2019-02-04 RX ORDER — LORAZEPAM 0.5 MG/1
TABLET ORAL
COMMUNITY
Start: 2015-03-19 | End: 2019-05-30 | Stop reason: SDUPTHER

## 2019-02-04 RX ORDER — METRONIDAZOLE 7.5 MG/G
1 GEL VAGINAL NIGHTLY
Qty: 70 G | Refills: 2 | Status: SHIPPED | OUTPATIENT
Start: 2019-02-04 | End: 2019-06-27

## 2019-02-04 RX ORDER — CLOBETASOL PROPIONATE 0.5 MG/G
CREAM TOPICAL
COMMUNITY
Start: 2016-09-28 | End: 2019-06-27

## 2019-02-04 RX ORDER — TRIAMCINOLONE ACETONIDE 1 MG/G
CREAM TOPICAL
COMMUNITY
Start: 2017-07-26 | End: 2019-06-27

## 2019-02-04 RX ORDER — PANTOPRAZOLE SODIUM 40 MG/1
TABLET, DELAYED RELEASE ORAL
COMMUNITY
Start: 2015-05-04 | End: 2019-06-27

## 2019-02-04 RX ORDER — ONDANSETRON 4 MG/1
TABLET, ORALLY DISINTEGRATING ORAL
Refills: 0 | COMMUNITY
Start: 2018-11-21 | End: 2019-05-06

## 2019-02-04 RX ORDER — PREDNISONE 20 MG/1
TABLET ORAL
COMMUNITY
Start: 2017-07-24 | End: 2019-05-06

## 2019-02-04 RX ORDER — MELOXICAM 15 MG/1
TABLET ORAL
Refills: 0 | COMMUNITY
Start: 2018-11-21 | End: 2019-05-06

## 2019-02-04 RX ORDER — LEVOTHYROXINE SODIUM 112 UG/1
112 TABLET ORAL
COMMUNITY
Start: 2015-04-13 | End: 2019-11-26 | Stop reason: SDUPTHER

## 2019-02-04 RX ORDER — DICLOFENAC SODIUM 10 MG/G
GEL TOPICAL
COMMUNITY
Start: 2015-07-17 | End: 2019-09-05 | Stop reason: SDUPTHER

## 2019-02-04 RX ORDER — DOXYCYCLINE 100 MG/1
CAPSULE ORAL
Refills: 0 | COMMUNITY
Start: 2018-11-21 | End: 2019-05-06

## 2019-02-04 RX ORDER — METOPROLOL SUCCINATE 25 MG/1
25 TABLET, EXTENDED RELEASE ORAL
COMMUNITY
Start: 2016-08-16 | End: 2019-05-06

## 2019-02-04 RX ORDER — OXYCODONE AND ACETAMINOPHEN 10; 325 MG/1; MG/1
1 TABLET ORAL EVERY 4 HOURS PRN
Refills: 0 | COMMUNITY
Start: 2018-12-12 | End: 2019-04-22

## 2019-02-04 RX ORDER — TIZANIDINE 4 MG/1
TABLET ORAL
COMMUNITY
Start: 2015-02-09 | End: 2019-06-27

## 2019-02-04 RX ORDER — GABAPENTIN 100 MG/1
CAPSULE ORAL
COMMUNITY
Start: 2015-05-04 | End: 2019-05-06

## 2019-02-04 NOTE — PROGRESS NOTES
Subjective:     Chief Complaint:  Vaginal bleeding  History of Present Illness: Thu Cr is a 83 y.o. female who presents for vaginal bleeding secondary to abrasion from pessary.  We removed the pessary in hopes that the abrasion would heal.  She is quite uncomfortable in his difficulty voiding minutes out so she did not leave it out for more than 1 week.  She reinserted but has still had some vaginal bleeding.  We previously discussed a smaller pessary as a possibility    Review of Systems    Constitutional: No acute distress. No weight gain/loss.  Eyes:  Cataracts  ENT: No headaches.   Respiratory: No SOB.  Cardiovascular: No chest pain. No edema. Hypertension  Gastrointestinal:  GERD  Genitourinary: No vaginal bleeding or discharge.  Integument/Breast: Negative  Hematologic/Lymphatic: No history of anemia.  Musculoskeletal:  back pain. No abdominal pain.  Neurological: DDD  Behavioral/Psych: No history of depression.   Endocrine:  Hypothyroid  Allergy/Immune: no recent reactions     Objective:   General Exam:  General appearance: WDNF. NAD.   HEENT: Sarah. EOM's intact.  Neck: Normal thyroid.   Back: No CVA tenderness.  RESP: No SOB.  Breasts: deferred  Abdomen: Benign without localizing signs.  Extremities:  Cellulitis right leg  Lymphatic: noncontributory  Skin: No rashes. No lesions.  Neurologic: Intact.   Psych: Oriented.   Pelvic Exam:  V:  No lesions. No palpable nodes.   Va:  Friable of abrasion at the apex.  We irrigated this with Hibiclens and put a number four ring pessary instead of the 6.  .Meatus:No caruncle or stenosis  Urethra: Non tender. No suburethral masses.  Cx/Cuff: Normal   Uterus:  No masses  Ad: No mass or tenderness.  Levators :Symmetrical. Normal tone. Non tender.  BL: Non tender  RV: No hemorrhoids.  Assessment:   Abrasion not likely to heal with the pessary in place at all times but she does not do very well when it removed       Plan:    Hopefully the smaller pessary will put  less pressure.  She will remove it nightly and for the next 2 weeks use [employer]

## 2019-05-06 ENCOUNTER — OFFICE VISIT (OUTPATIENT)
Dept: UROGYNECOLOGY | Facility: CLINIC | Age: 84
End: 2019-05-06
Payer: MEDICARE

## 2019-05-06 VITALS
HEART RATE: 78 BPM | WEIGHT: 132.69 LBS | SYSTOLIC BLOOD PRESSURE: 135 MMHG | DIASTOLIC BLOOD PRESSURE: 82 MMHG | HEIGHT: 67 IN | BODY MASS INDEX: 20.83 KG/M2

## 2019-05-06 DIAGNOSIS — R35.0 URINARY FREQUENCY: Primary | ICD-10-CM

## 2019-05-06 DIAGNOSIS — N76.5 VAGINAL ULCERATION: ICD-10-CM

## 2019-05-06 DIAGNOSIS — N81.89 PELVIC RELAXATION: ICD-10-CM

## 2019-05-06 LAB
BILIRUB SERPL-MCNC: ABNORMAL MG/DL
BLOOD URINE, POC: 250
COLOR, POC UA: YELLOW
GLUCOSE UR QL STRIP: ABNORMAL
KETONES UR QL STRIP: ABNORMAL
LEUKOCYTE ESTERASE URINE, POC: ABNORMAL
NITRITE, POC UA: ABNORMAL
PH, POC UA: 6
PROTEIN, POC: ABNORMAL
SPECIFIC GRAVITY, POC UA: 1.02
UROBILINOGEN, POC UA: ABNORMAL

## 2019-05-06 PROCEDURE — 99213 OFFICE O/P EST LOW 20 MIN: CPT | Mod: S$PBB,,, | Performed by: OBSTETRICS & GYNECOLOGY

## 2019-05-06 PROCEDURE — 99213 OFFICE O/P EST LOW 20 MIN: CPT | Mod: PBBFAC,PO | Performed by: OBSTETRICS & GYNECOLOGY

## 2019-05-06 PROCEDURE — 99999 PR PBB SHADOW E&M-EST. PATIENT-LVL III: ICD-10-PCS | Mod: PBBFAC,,, | Performed by: OBSTETRICS & GYNECOLOGY

## 2019-05-06 PROCEDURE — 81002 URINALYSIS NONAUTO W/O SCOPE: CPT | Mod: PBBFAC,PO | Performed by: OBSTETRICS & GYNECOLOGY

## 2019-05-06 PROCEDURE — 99999 PR PBB SHADOW E&M-EST. PATIENT-LVL III: CPT | Mod: PBBFAC,,, | Performed by: OBSTETRICS & GYNECOLOGY

## 2019-05-06 PROCEDURE — 99213 PR OFFICE/OUTPT VISIT, EST, LEVL III, 20-29 MIN: ICD-10-PCS | Mod: S$PBB,,, | Performed by: OBSTETRICS & GYNECOLOGY

## 2019-05-06 NOTE — PROGRESS NOTES
Subjective:     Chief Complaint:  Bladder prolapse  History of Present Illness: Thu Cr is a 83 y.o. female who presents for pelvic relaxation.  She had a 6.  Ring pessary and did well for awhile but developed an ulceration some bleeding.  We tried removing the pessary but she was too uncomfortable so short while later she reinserted.  We switched to a smaller, 4.  Ring pessary.  She has been removing it every other day but she notices that the bladder sometimes begins to protrude around it.  She has not tried removing the pessary and replaced it.  She question whether there was a size in between that we could use.  She is not having any bleeding or discharge presently    Review of Systems    Constitutional:  Insomnia  Eyes: No vision changes.  ENT: No headaches.   Respiratory:  COPD.  Cardiovascular: No chest pain. No edema.  Gastrointestinal:  GERD  Genitourinary: No vaginal bleeding or discharge.  Integument/Breast: Negative  Hematologic/Lymphatic: No history of anemia.  Musculoskeletal: OA  Neurological: No known disc problems. No paresthesias.  Behavioral/Psych: No history of depression.   Endocrine:  Thyroid hormonal replacement.  Allergy/Immune: no recent reactions     Objective:   General Exam:  General appearance: WDNF. NAD.   HEENT: Sarah. EOM's intact.  Neck: Normal thyroid.   Back: No CVA tenderness.  RESP: No SOB.  Breasts: deferred  Abdomen: Benign without localizing signs.  Extremities: No edema. No varices.  Lymphatic: noncontributory  Skin: No rashes. No lesions.  Neurologic: Intact.   Psych: Oriented.   Pelvic Exam:  V:  No lesions. No palpable nodes.   Va:  Diffuse relaxation with mild abrasion effect right cuff.  The number for ring pessary seems to fit well but on matching up with a 5.  It seems the same or slightly bigger so there is some discrepancy in the sizing.    .Meatus:No caruncle or stenosis  Urethra: Non tender. No suburethral masses.  Cx/Cuff: Normal   Uterus:  Elevated well  with the pessary  Ad: No mass or tenderness.  Levators :Symmetrical. Normal tone. Non tender.  BL: Non tender  RV: No hemorrhoids.  Assessment:   Rather than 4.  Pessary is seems like she has actually has a 5.  Since she had irritation with the 6, I would not like to increase the size at this time       Plan:      She will remove and clean the pessary at least every other day and if the bladder prolapses around that she will remove it and replace it rather than trying to push the bladder up above the pessary.  If she is unsuccessful with this and she has no further ulcerations, we will try going back to a 6.  Pessary

## 2019-06-27 PROBLEM — H35.30 ARMD (AGE RELATED MACULAR DEGENERATION): Status: ACTIVE | Noted: 2019-06-27

## 2019-07-09 ENCOUNTER — OFFICE VISIT (OUTPATIENT)
Dept: UROGYNECOLOGY | Facility: CLINIC | Age: 84
End: 2019-07-09
Payer: MEDICARE

## 2019-07-09 VITALS
HEART RATE: 76 BPM | DIASTOLIC BLOOD PRESSURE: 76 MMHG | WEIGHT: 132.25 LBS | SYSTOLIC BLOOD PRESSURE: 131 MMHG | BODY MASS INDEX: 20.76 KG/M2 | HEIGHT: 67 IN

## 2019-07-09 DIAGNOSIS — N76.1 SUBACUTE VAGINITIS: ICD-10-CM

## 2019-07-09 DIAGNOSIS — N81.89 PELVIC RELAXATION: Primary | ICD-10-CM

## 2019-07-09 DIAGNOSIS — N76.5 VAGINAL MUCOUS MEMBRANE ULCERATION: ICD-10-CM

## 2019-07-09 PROCEDURE — 57160 PR FIT/INSERT INTRAVAG SUPPORT DEVICE: ICD-10-PCS | Mod: S$PBB,,, | Performed by: OBSTETRICS & GYNECOLOGY

## 2019-07-09 PROCEDURE — 99213 PR OFFICE/OUTPT VISIT, EST, LEVL III, 20-29 MIN: ICD-10-PCS | Mod: 25,S$PBB,, | Performed by: OBSTETRICS & GYNECOLOGY

## 2019-07-09 PROCEDURE — 57160 INSERT PESSARY/OTHER DEVICE: CPT | Mod: S$PBB,,, | Performed by: OBSTETRICS & GYNECOLOGY

## 2019-07-09 PROCEDURE — 99213 OFFICE O/P EST LOW 20 MIN: CPT | Mod: PBBFAC,PO | Performed by: OBSTETRICS & GYNECOLOGY

## 2019-07-09 PROCEDURE — 99213 OFFICE O/P EST LOW 20 MIN: CPT | Mod: 25,S$PBB,, | Performed by: OBSTETRICS & GYNECOLOGY

## 2019-07-09 PROCEDURE — 99999 PR PBB SHADOW E&M-EST. PATIENT-LVL III: ICD-10-PCS | Mod: PBBFAC,,, | Performed by: OBSTETRICS & GYNECOLOGY

## 2019-07-09 PROCEDURE — 99999 PR PBB SHADOW E&M-EST. PATIENT-LVL III: CPT | Mod: PBBFAC,,, | Performed by: OBSTETRICS & GYNECOLOGY

## 2019-07-09 PROCEDURE — 57160 INSERT PESSARY/OTHER DEVICE: CPT | Mod: PBBFAC,PO | Performed by: OBSTETRICS & GYNECOLOGY

## 2019-07-11 ENCOUNTER — TELEPHONE (OUTPATIENT)
Dept: UROGYNECOLOGY | Facility: CLINIC | Age: 84
End: 2019-07-11

## 2019-07-11 NOTE — TELEPHONE ENCOUNTER
Spoke with Mari at [facility] office and informed her she would need to get surgical clearance from patients PCP.

## 2019-07-11 NOTE — TELEPHONE ENCOUNTER
----- Message from Higinio Coe sent at 7/11/2019  8:48 AM CDT -----  Type: Needs Medical Advice    Who Called:  Mari/[facility]    Best Call Back Number: [phone]  Additional Information: Caller states that she would like a callback regarding surgical clearance for the patient.

## 2019-09-05 PROBLEM — Z96.652 STATUS POST LEFT KNEE REPLACEMENT: Status: ACTIVE | Noted: 2019-09-05

## 2019-09-23 ENCOUNTER — OFFICE VISIT (OUTPATIENT)
Dept: UROGYNECOLOGY | Facility: CLINIC | Age: 84
End: 2019-09-23
Payer: MEDICARE

## 2019-09-23 VITALS
BODY MASS INDEX: 20.87 KG/M2 | WEIGHT: 132.94 LBS | SYSTOLIC BLOOD PRESSURE: 130 MMHG | HEART RATE: 77 BPM | DIASTOLIC BLOOD PRESSURE: 80 MMHG | HEIGHT: 67 IN

## 2019-09-23 DIAGNOSIS — R35.0 URINARY FREQUENCY: Primary | ICD-10-CM

## 2019-09-23 DIAGNOSIS — N81.89 PELVIC RELAXATION: ICD-10-CM

## 2019-09-23 DIAGNOSIS — N76.1 CHRONIC VAGINITIS: ICD-10-CM

## 2019-09-23 LAB
BILIRUB SERPL-MCNC: ABNORMAL MG/DL
BLOOD URINE, POC: ABNORMAL
COLOR, POC UA: YELLOW
GLUCOSE UR QL STRIP: ABNORMAL
KETONES UR QL STRIP: ABNORMAL
LEUKOCYTE ESTERASE URINE, POC: ABNORMAL
NITRITE, POC UA: ABNORMAL
PH, POC UA: 5
PROTEIN, POC: ABNORMAL
SPECIFIC GRAVITY, POC UA: 1.03
UROBILINOGEN, POC UA: ABNORMAL

## 2019-09-23 PROCEDURE — 99213 PR OFFICE/OUTPT VISIT, EST, LEVL III, 20-29 MIN: ICD-10-PCS | Mod: S$PBB,,, | Performed by: OBSTETRICS & GYNECOLOGY

## 2019-09-23 PROCEDURE — 99213 OFFICE O/P EST LOW 20 MIN: CPT | Mod: PBBFAC,PO | Performed by: OBSTETRICS & GYNECOLOGY

## 2019-09-23 PROCEDURE — 99999 PR PBB SHADOW E&M-EST. PATIENT-LVL III: ICD-10-PCS | Mod: PBBFAC,,, | Performed by: OBSTETRICS & GYNECOLOGY

## 2019-09-23 PROCEDURE — 99213 OFFICE O/P EST LOW 20 MIN: CPT | Mod: S$PBB,,, | Performed by: OBSTETRICS & GYNECOLOGY

## 2019-09-23 PROCEDURE — 81002 URINALYSIS NONAUTO W/O SCOPE: CPT | Mod: PBBFAC,PO | Performed by: OBSTETRICS & GYNECOLOGY

## 2019-09-23 PROCEDURE — 99999 PR PBB SHADOW E&M-EST. PATIENT-LVL III: CPT | Mod: PBBFAC,,, | Performed by: OBSTETRICS & GYNECOLOGY

## 2019-09-23 NOTE — PROGRESS NOTES
Subjective:     Chief Complaint:  Pessary management  History of Present Illness: Thu Cr is a 84 y.o. female who presents for pessary management.  She has a ring pessary.  She removes in cleaned every other day and has had no significant discharge pain or bleeding.  She says sometimes it is difficult to handle it because of its stiffness.  She was unaware that it collapses  only 1 direction because of the spring inside.  Overall she is pleased with how she is doing with the pessary    Review of Systems    Constitutional: No acute distress. No weight gain/loss.  Eyes:  Macular degeneration  ENT: No headaches.   Respiratory: COPD  Cardiovascular: No chest pain. No edema. Hypertension  Gastrointestinal: GERD  Genitourinary: No vaginal bleeding or discharge.  Integument/Breast: Negative  Hematologic/Lymphatic: No history of anemia.  Musculoskeletal: OA  Neurological: No known disc problems. No paresthesias.  Behavioral/Psych: No history of depression.   Endocrine: thyroid hormonal replacement.  Allergy/Immune: no recent reactions     Objective:   General Exam:  General appearance: WDNF. NAD.   HEENT: Sarah. EOM's intact.  Neck: Normal thyroid.   Back: No CVA tenderness.  RESP: No SOB.  Breasts: deferred  Abdomen: Benign without localizing signs.  Extremities: No edema. No varices.  Lymphatic: noncontributory  Skin: No rashes. No lesions.  Neurologic: Intact.   Psych: Oriented.   Pelvic Exam:  V:  No lesions. No palpable nodes.   Va:  Pessary fits well.  No significant lesions although there is moderate discharge.  The pessary was removed in clean and after Betadine irrigation replaced.  .Meatus:No caruncle or stenosis  Urethra: Non tender. No suburethral masses.  Cx/Cuff: Normal   Uterus: Surgically absent.  Ad: No mass or tenderness.  Levators :Symmetrical. Normal tone. Non tender.  BL: Non tender  RV: No hemorrhoids.  Assessment:   Seems to be doing well as long she removes and replaces the pessary every other  day.  She was shown which direction it collapses so that it will be easier for her to place it and remove it       Plan:      Return in 6 months or p.r.n.

## 2019-10-08 NOTE — TELEPHONE ENCOUNTER
Can we see if she is using the estrace.  I went to refill it and the computer states that they vaginal tablets are recommended instead.

## 2019-10-08 NOTE — TELEPHONE ENCOUNTER
Called and spoke to pot and she has not had it filled in a while and she had only asked the pharmacy how it would coast to fill it and they must had sent a refill request in. Did not want to switch to pills unless the dr's told her to.

## 2019-10-10 RX ORDER — ESTRADIOL 0.1 MG/G
CREAM VAGINAL
Qty: 42.5 G | Refills: 3 | Status: SHIPPED | OUTPATIENT
Start: 2019-10-10 | End: 2021-07-12

## 2020-01-20 ENCOUNTER — TELEPHONE (OUTPATIENT)
Dept: UROGYNECOLOGY | Facility: CLINIC | Age: 85
End: 2020-01-20

## 2020-01-20 NOTE — TELEPHONE ENCOUNTER
----- Message from Silas Boyce sent at 1/20/2020  8:16 AM CST -----  Contact: same  Patient called in and stated she cannot get her pessary in and is in some pain.  Patient would like to see if she can either be seen later today 1/20/2020 or tomorrow 1/21/2020 as she has to get transportation.    Patient call back number is [phone]

## 2020-01-20 NOTE — TELEPHONE ENCOUNTER
Called and spoke to pt and she states that she can't get the pessary in states it feels like it has shifted,scheduled with BEBETO DOMINGUEZ tomorrow at 1100.

## 2020-01-21 ENCOUNTER — OFFICE VISIT (OUTPATIENT)
Dept: UROGYNECOLOGY | Facility: CLINIC | Age: 85
End: 2020-01-21
Payer: MEDICARE

## 2020-01-21 VITALS
WEIGHT: 140.88 LBS | BODY MASS INDEX: 22.11 KG/M2 | HEART RATE: 88 BPM | SYSTOLIC BLOOD PRESSURE: 149 MMHG | DIASTOLIC BLOOD PRESSURE: 90 MMHG | HEIGHT: 67 IN

## 2020-01-21 DIAGNOSIS — N81.89 PELVIC RELAXATION: ICD-10-CM

## 2020-01-21 DIAGNOSIS — Z46.89 PESSARY MAINTENANCE: ICD-10-CM

## 2020-01-21 DIAGNOSIS — R30.0 DYSURIA: ICD-10-CM

## 2020-01-21 DIAGNOSIS — N76.1 CHRONIC VAGINITIS: ICD-10-CM

## 2020-01-21 DIAGNOSIS — R35.0 URINARY FREQUENCY: Primary | ICD-10-CM

## 2020-01-21 PROCEDURE — 1125F PR PAIN SEVERITY QUANTIFIED, PAIN PRESENT: ICD-10-PCS | Mod: ,,, | Performed by: NURSE PRACTITIONER

## 2020-01-21 PROCEDURE — 57160 INSERT PESSARY/OTHER DEVICE: CPT | Mod: PBBFAC,PO | Performed by: NURSE PRACTITIONER

## 2020-01-21 PROCEDURE — 57160 PR FIT/INSERT INTRAVAG SUPPORT DEVICE: ICD-10-PCS | Mod: S$PBB,,, | Performed by: NURSE PRACTITIONER

## 2020-01-21 PROCEDURE — 99214 OFFICE O/P EST MOD 30 MIN: CPT | Mod: 25,S$PBB,, | Performed by: NURSE PRACTITIONER

## 2020-01-21 PROCEDURE — 87086 URINE CULTURE/COLONY COUNT: CPT

## 2020-01-21 PROCEDURE — 99999 PR PBB SHADOW E&M-EST. PATIENT-LVL IV: ICD-10-PCS | Mod: PBBFAC,,, | Performed by: NURSE PRACTITIONER

## 2020-01-21 PROCEDURE — 57160 INSERT PESSARY/OTHER DEVICE: CPT | Mod: S$PBB,,, | Performed by: NURSE PRACTITIONER

## 2020-01-21 PROCEDURE — 99999 PR PBB SHADOW E&M-EST. PATIENT-LVL IV: CPT | Mod: PBBFAC,,, | Performed by: NURSE PRACTITIONER

## 2020-01-21 PROCEDURE — 1125F AMNT PAIN NOTED PAIN PRSNT: CPT | Mod: ,,, | Performed by: NURSE PRACTITIONER

## 2020-01-21 PROCEDURE — 99214 PR OFFICE/OUTPT VISIT, EST, LEVL IV, 30-39 MIN: ICD-10-PCS | Mod: 25,S$PBB,, | Performed by: NURSE PRACTITIONER

## 2020-01-21 PROCEDURE — 51701 INSERT BLADDER CATHETER: CPT | Mod: PBBFAC,PO | Performed by: NURSE PRACTITIONER

## 2020-01-21 PROCEDURE — 81002 URINALYSIS NONAUTO W/O SCOPE: CPT | Mod: PBBFAC,PO | Performed by: NURSE PRACTITIONER

## 2020-01-21 PROCEDURE — 1159F PR MEDICATION LIST DOCUMENTED IN MEDICAL RECORD: ICD-10-PCS | Mod: ,,, | Performed by: NURSE PRACTITIONER

## 2020-01-21 PROCEDURE — 99214 OFFICE O/P EST MOD 30 MIN: CPT | Mod: PBBFAC,PO,25 | Performed by: NURSE PRACTITIONER

## 2020-01-21 PROCEDURE — 1159F MED LIST DOCD IN RCRD: CPT | Mod: ,,, | Performed by: NURSE PRACTITIONER

## 2020-01-21 RX ORDER — ACETAMINOPHEN 325 MG/1
325 TABLET ORAL EVERY 6 HOURS PRN
COMMUNITY

## 2020-01-21 RX ORDER — NITROFURANTOIN (MACROCRYSTALS) 100 MG/1
100 CAPSULE ORAL EVERY 12 HOURS
Qty: 10 CAPSULE | Refills: 0 | Status: SHIPPED | OUTPATIENT
Start: 2020-01-21 | End: 2020-01-26

## 2020-01-21 NOTE — PROGRESS NOTES
Subjective:       Patient ID: Thu Cr is a 84 y.o. female.    Chief Complaint: Pessary Check    HPI  Thu Cr is a 84 y.o. female who presents today for pessary concerns.  She took the pessary out on Saturday night as per her usual routine.  When she tried to replace the pessary she had a lot of difficulty with this and ultimately was unable to replace the pessary.  She tried 4 different times to get the pessary it.  What happened was that she would insert the pessary but she did not feel that she could advance it all the way.  She also felt that it was right by the introitus and that this was rubbing and very uncomfortable.  She is also wondering if she might have a UTI.  She has been having some dysuria and her urine has a strong odor to it.  Pt attempted to give a urine specimen, but missed the collection cup.  She is ok with getting a cath specimen.    Review of Systems   Constitutional: Negative for activity change, fever and unexpected weight change.   HENT: Negative for hearing loss.    Eyes: Negative for visual disturbance.   Respiratory: Negative for shortness of breath and wheezing.    Cardiovascular: Negative for chest pain, palpitations and leg swelling.   Gastrointestinal: Negative for abdominal pain, constipation and diarrhea.   Genitourinary: Positive for dysuria, frequency and urgency. Negative for dyspareunia, vaginal bleeding and vaginal discharge.        Vaginal irritation   Musculoskeletal: Negative for gait problem and neck pain.   Skin: Negative for rash and wound.   Allergic/Immunologic: Negative for immunocompromised state.   Neurological: Negative for tremors, speech difficulty and weakness.   Hematological: Does not bruise/bleed easily.   Psychiatric/Behavioral: Negative for agitation and confusion.       Objective:      Physical Exam   Constitutional: She is oriented to person, place, and time. She appears well-developed and well-nourished. No distress.   HENT:   Head:  Normocephalic and atraumatic.   Neck: Neck supple. No thyromegaly present.   Pulmonary/Chest: Effort normal. No respiratory distress.   Abdominal: Soft. There is tenderness in the right lower quadrant and suprapubic area. No hernia.   Mild abd. tenderness   Musculoskeletal: Normal range of motion.   Neurological: She is alert and oriented to person, place, and time.   Skin: Skin is warm and dry. No rash noted.   Psychiatric: She has a normal mood and affect. Her behavior is normal.     Pelvic Exam:  V: No lesions. No palpable nodes.   Va: small amount of thin white discharge. No bleeding noted.  Dominant midline cystocele Ba=0, diffuse relaxation  Meatus:No caruncle or stenosis  Urethra: Non tender. No suburethral masses.  Cx/Cuff: Normal   Uterus: small, non-tender  Ad: No mass or tenderness.  Levators :Symmetrical. Normal tone. Non tender.  BL: mildly tender  RV: No hemorrhoids.      Assessment:       1. Urinary frequency    2. Pelvic relaxation    3. Pessary maintenance    4. Chronic vaginitis    5. Dysuria          Procedure note- After Hibicleanse irrigation of the vagina, since the pt is ALLERGIC to IODINE, the pt's #6 ring pessary without support was inserted into the vagina.  It was felt that this was slightly too big and it was removed.  A #5 ring pessary without support was inserted into the vagina and the pt felt that this was a better fit and not uncomfortable like the other pessary was.   Pt ambulated in the room and denies any discomfort.  NP reminded pt that the first 24-48 hours are the most likely time for the pessary to fall out and to monitor the toilet before flushing.  Pt verbalized understanding.      Procedure note- After Hibicleanse irrigation of the urethra, since the pt is ALLERGIC to IODINE,  lidocaine instilled via urojet.   A #14 Turkmen red rubber tip catheter was inserted into the bladder.  10 mls of residual urine noted.     Plan:       Urinary frequency- we will send her urine for  culture as noted below  -     POCT urine dipstick without microscope  -     Urine culture    Pelvic relaxation- trial of #5 ring pessary without support    Pessary maintenance- as noted above    Chronic vaginitis- monitor    Dysuria- as noted below pending urine culture results.  -     nitrofurantoin (MACRODANTIN) 100 MG capsule; Take 1 capsule (100 mg total) by mouth every 12 (twelve) hours. for 5 days  Dispense: 10 capsule; Refill: 0    RTC 2 months

## 2020-01-22 LAB — BACTERIA UR CULT: NO GROWTH
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
## 2020-02-22 PROBLEM — S52.501A CLOSED FRACTURE OF DISTAL END OF RIGHT RADIUS: Status: ACTIVE | Noted: 2020-02-22

## 2020-02-27 ENCOUNTER — TELEPHONE (OUTPATIENT)
Dept: UROGYNECOLOGY | Facility: CLINIC | Age: 85
End: 2020-02-27

## 2020-02-27 NOTE — TELEPHONE ENCOUNTER
I'm sorry to hear that she was injured.  I am ok with the home health nurse placing the pessary, but I'm not sure that they will do this.  We can call the company to verify what the home health nurse can and can't do.

## 2020-02-27 NOTE — TELEPHONE ENCOUNTER
----- Message from Ashanti Oakes sent at 2/27/2020  2:13 PM CST -----  Contact: Blanca Johns (Daughter in Law)  Type: Needs Medical Advice    Who Called:  Blanca Johns (Daughter in Law)  Best Call Back Number:   Additional Information: Calling to speak with the nurse about getting set up for home health.

## 2020-02-27 NOTE — TELEPHONE ENCOUNTER
Fell and has a large cast on her right arm and was wanting to get a nurse from [facility] to come in and place the pessary in for her. Daughter in law states that she can take it out but can not put it back in. Informed that she can leave the pessary in for 3 months , but was told by daughter in law that Mrs Andino has to have her pessary removed more often because the vaginal area tends to break down when she leaves it in. This is why she is asking for home health to come in and help her insert it. Informed that I don't believe that [facility] will do this. But will discuss with The nurse practioner.  Daughter in law states that Danitza Cool  Can call Thu.

## 2020-02-28 NOTE — TELEPHONE ENCOUNTER
Called [facility] [phone] to see if there is someone there who can place pessary in for patient, left message on recorder. Called patient and informed

## 2020-02-28 NOTE — TELEPHONE ENCOUNTER
Spoke to patient and she was wondering would it hurt for her to leave it in for 5-6 nights, informed that I did not think it would be a problem but will check with Dr Hernandez on Monday. patient states she can not leave the pessary in for months , due to she gets infections, informed I would let Dr Hernandez know and see what he suggests. Patient just wants guidance.

## 2020-03-02 PROBLEM — G47.00 INSOMNIA: Status: ACTIVE | Noted: 2020-03-02

## 2020-03-02 NOTE — TELEPHONE ENCOUNTER
"Left message" that as long as she tolerates it well it would not be an issue" on phone. To call back  If any other questions.            "

## 2020-04-09 ENCOUNTER — TELEPHONE (OUTPATIENT)
Dept: UROGYNECOLOGY | Facility: CLINIC | Age: 85
End: 2020-04-09

## 2020-04-09 NOTE — TELEPHONE ENCOUNTER
----- Message from Susanna Mccabe sent at 4/9/2020  9:12 AM CDT -----  Contact: CC/Homehealth nurse  Type: Needs Medical Advice  Who Called:  MERY  Best Call Back Number: [phone]  Additional Information: CC would like to speak with the nurse about patients insertion of pessary , patient is not able to do this not staying in place keeps falling.  Please call to advise as soon as possible and CC will stay at the home until office calls.  Thanks!

## 2020-04-14 ENCOUNTER — OFFICE VISIT (OUTPATIENT)
Dept: UROGYNECOLOGY | Facility: CLINIC | Age: 85
End: 2020-04-14
Payer: MEDICARE

## 2020-04-14 VITALS
BODY MASS INDEX: 21.9 KG/M2 | DIASTOLIC BLOOD PRESSURE: 89 MMHG | WEIGHT: 139.56 LBS | HEART RATE: 81 BPM | SYSTOLIC BLOOD PRESSURE: 160 MMHG | HEIGHT: 67 IN

## 2020-04-14 DIAGNOSIS — R35.0 URINARY FREQUENCY: Primary | ICD-10-CM

## 2020-04-14 DIAGNOSIS — Z46.89 PESSARY MAINTENANCE: ICD-10-CM

## 2020-04-14 DIAGNOSIS — N81.11 CYSTOCELE, MIDLINE: ICD-10-CM

## 2020-04-14 DIAGNOSIS — N81.89 PELVIC RELAXATION: ICD-10-CM

## 2020-04-14 PROCEDURE — 57160 PR FIT/INSERT INTRAVAG SUPPORT DEVICE: ICD-10-PCS | Mod: S$PBB,,, | Performed by: NURSE PRACTITIONER

## 2020-04-14 PROCEDURE — 99214 OFFICE O/P EST MOD 30 MIN: CPT | Mod: PBBFAC,PO,25 | Performed by: NURSE PRACTITIONER

## 2020-04-14 PROCEDURE — 57160 INSERT PESSARY/OTHER DEVICE: CPT | Mod: PBBFAC,PO | Performed by: NURSE PRACTITIONER

## 2020-04-14 PROCEDURE — 99214 PR OFFICE/OUTPT VISIT, EST, LEVL IV, 30-39 MIN: ICD-10-PCS | Mod: S$PBB,25,, | Performed by: NURSE PRACTITIONER

## 2020-04-14 PROCEDURE — 99214 OFFICE O/P EST MOD 30 MIN: CPT | Mod: S$PBB,25,, | Performed by: NURSE PRACTITIONER

## 2020-04-14 PROCEDURE — 99999 PR PBB SHADOW E&M-EST. PATIENT-LVL IV: ICD-10-PCS | Mod: PBBFAC,,, | Performed by: NURSE PRACTITIONER

## 2020-04-14 PROCEDURE — 99999 PR PBB SHADOW E&M-EST. PATIENT-LVL IV: CPT | Mod: PBBFAC,,, | Performed by: NURSE PRACTITIONER

## 2020-04-14 PROCEDURE — 57160 INSERT PESSARY/OTHER DEVICE: CPT | Mod: S$PBB,,, | Performed by: NURSE PRACTITIONER

## 2020-04-14 RX ORDER — HYDROCODONE BITARTRATE AND ACETAMINOPHEN 5; 325 MG/1; MG/1
TABLET ORAL
COMMUNITY
Start: 2020-03-06 | End: 2020-08-20

## 2020-04-14 NOTE — PROGRESS NOTES
Subjective:       Patient ID: Thu Cr is a 84 y.o. female.    Chief Complaint: Pessary Check    HPI  Thu Cr is a 84 y.o. female who presents today for pessary concerns.  When she was last seen in our office on 01/21/20, she was having problems replacing her pessary.  At that visit we tried a slightly smaller #5 ring without support.  She was able to remove and replace this herself and seemed to feel that it was doing ok.  She ended up falling and fracturing her right wrist on 02/22/20.  Since that time, she has been able to remove the pessary, but she is not able to replace it.  She has been having a home health nurse come to her house once a week.  The pt removes the pessary the night before and then the nurse replaces the pessary the next day.  This has been going fine until last week.  At last weeks visit, the nurse had difficulty replacing the pessary.  She ended up trying a few different pessaries that the pt had at her home before she was able to get the current pessary in.  The pt is not sure what pessary the nurse was able to place.  The pt did have a little bit of spotting after the nurses visit last Thursday, but has not had any since then.  The pt also has been feeling that the bulge is coming around the pessary at times.  She is not sure what should be done, but she is interested in looking into surgery after her wrist has healed.  She denies any real changes in her urinary symptoms at this time.  She has a lot of frequency but this is not new for her.  She denies any other acute complaints/concerns and is ready to proceed with the pessary.    Review of Systems   Constitutional: Negative for activity change, fever and unexpected weight change.   HENT: Negative for hearing loss.    Eyes: Negative for visual disturbance.   Respiratory: Negative for shortness of breath and wheezing.    Cardiovascular: Negative for chest pain, palpitations and leg swelling.   Gastrointestinal: Negative for  abdominal pain, constipation and diarrhea.   Genitourinary: Positive for frequency. Negative for dyspareunia, dysuria, urgency, vaginal bleeding and vaginal discharge.   Musculoskeletal: Negative for gait problem and neck pain.   Skin: Negative for rash and wound.   Allergic/Immunologic: Negative for immunocompromised state.   Neurological: Negative for tremors, speech difficulty and weakness.   Hematological: Does not bruise/bleed easily.   Psychiatric/Behavioral: Negative for agitation and confusion.       Objective:      Physical Exam   Constitutional: She is oriented to person, place, and time. She appears well-developed and well-nourished. No distress.   HENT:   Head: Normocephalic and atraumatic.   Neck: Neck supple. No thyromegaly present.   Pulmonary/Chest: Effort normal. No respiratory distress.   Abdominal: Soft. There is no tenderness. No hernia.   Musculoskeletal: Normal range of motion.   Neurological: She is alert and oriented to person, place, and time.   Skin: Skin is warm and dry. No rash noted.   Psychiatric: She has a normal mood and affect. Her behavior is normal.     Pelvic Exam:  V: No lesions. No palpable nodes.   Va: small amount of thin yellow discharge.  No bleeding noted.  Good length. Dominant midline cystocele Ba=0. Diffuse relaxation.  Meatus:No caruncle or stenosis  Urethra: Non tender. No suburethral masses.  Cx/Cuff: Normal   Uterus: small, non-tender  Ad: No mass or tenderness.  Levators :Symmetrical. Normal tone. Non tender.  BL: Non tender  RV: No hemorrhoids.      Assessment:       1. Urinary frequency    2. Pelvic relaxation    3. Cystocele, midline    4. Pessary maintenance          Procedure note- #5 ring pessary WITHOUTsupport removed and cleaned with soap and water.  After Hibicleanse irrigation of the vagina, since the pt is ALLERGIC to IODINE,  A new #5 ring pessary WITH support was inserted into the vagina.  Pt ambulated in the room and denies any discomfort.  NP reminded  pt that the first 24-48 hours are the most likely time for the pessary to fall out and to monitor the toilet before flushing.  Pt verbalized understanding.      Plan:       Urinary frequency- monitor    Pelvic relaxation- trial of #5 ring WITH support pessary    Cystocele, midline- as noted above    Pessary maintenance- as noted above    RTC 2 months

## 2020-07-27 ENCOUNTER — OFFICE VISIT (OUTPATIENT)
Dept: UROGYNECOLOGY | Facility: CLINIC | Age: 85
End: 2020-07-27
Payer: MEDICARE

## 2020-07-27 VITALS
DIASTOLIC BLOOD PRESSURE: 88 MMHG | WEIGHT: 140.19 LBS | SYSTOLIC BLOOD PRESSURE: 167 MMHG | HEIGHT: 67 IN | BODY MASS INDEX: 22 KG/M2 | HEART RATE: 75 BPM

## 2020-07-27 DIAGNOSIS — R35.0 URINARY FREQUENCY: ICD-10-CM

## 2020-07-27 DIAGNOSIS — N39.46 MIXED INCONTINENCE URGE AND STRESS: ICD-10-CM

## 2020-07-27 DIAGNOSIS — Z01.812 PRE-OPERATIVE LABORATORY EXAMINATION: ICD-10-CM

## 2020-07-27 DIAGNOSIS — N81.89 PELVIC RELAXATION: Primary | ICD-10-CM

## 2020-07-27 LAB
BILIRUB SERPL-MCNC: ABNORMAL MG/DL
BLOOD URINE, POC: ABNORMAL
CLARITY, POC UA: CLEAR
COLOR, POC UA: YELLOW
GLUCOSE UR QL STRIP: ABNORMAL
KETONES UR QL STRIP: ABNORMAL
LEUKOCYTE ESTERASE URINE, POC: ABNORMAL
NITRITE, POC UA: ABNORMAL
PH, POC UA: 5
PROTEIN, POC: ABNORMAL
SPECIFIC GRAVITY, POC UA: 1.03
UROBILINOGEN, POC UA: ABNORMAL

## 2020-07-27 PROCEDURE — 99999 PR PBB SHADOW E&M-EST. PATIENT-LVL V: ICD-10-PCS | Mod: PBBFAC,,, | Performed by: OBSTETRICS & GYNECOLOGY

## 2020-07-27 PROCEDURE — 81002 URINALYSIS NONAUTO W/O SCOPE: CPT | Mod: PBBFAC,PO | Performed by: OBSTETRICS & GYNECOLOGY

## 2020-07-27 PROCEDURE — 99215 OFFICE O/P EST HI 40 MIN: CPT | Mod: PBBFAC,PO | Performed by: OBSTETRICS & GYNECOLOGY

## 2020-07-27 PROCEDURE — 99213 OFFICE O/P EST LOW 20 MIN: CPT | Mod: S$PBB,,, | Performed by: OBSTETRICS & GYNECOLOGY

## 2020-07-27 PROCEDURE — 99999 PR PBB SHADOW E&M-EST. PATIENT-LVL V: CPT | Mod: PBBFAC,,, | Performed by: OBSTETRICS & GYNECOLOGY

## 2020-07-27 PROCEDURE — 99213 PR OFFICE/OUTPT VISIT, EST, LEVL III, 20-29 MIN: ICD-10-PCS | Mod: S$PBB,,, | Performed by: OBSTETRICS & GYNECOLOGY

## 2020-07-27 RX ORDER — SODIUM CHLORIDE 9 MG/ML
INJECTION, SOLUTION INTRAVENOUS CONTINUOUS
Status: CANCELLED | OUTPATIENT
Start: 2020-07-27

## 2020-07-27 NOTE — PROGRESS NOTES
Subjective:     Chief Complaint:  Prolapse  History of Present Illness: Thu Cr is a 85 y.o. female who presents for worsening vaginal prolapse.  She has been using a pessary for about 5 years and had done fairly well although lately she is having more difficulty removing placing it.  She recently broke her wrist and this makes it very difficult for her to remove it.  Once a week she tries to remove it and home health nurse comes and and replaces it the next day.  She is now desirous of definitive surgical therapy.  She is not  her sexually active and is desirous of a colpocleisis.  She has mixed incontinence and in the past question whether surgery would help this as well.  She is not presently on any medications for overactive bladder    Review of Systems    Constitutional:  Insomnia  Eyes:  Cataracts and macular degeneration  ENT: No headaches.   Respiratory: No SOB.  Cardiovascular: No chest pain. No edema. Hypertension  Gastrointestinal:  GERD  Genitourinary: No vaginal bleeding or discharge.  Integument/Breast:  Recent skin cancer removal  Hematologic/Lymphatic: No history of anemia.  Musculoskeletal:  Fractured wrist  Neurological: No known disc problems. No paresthesias.  Behavioral/Psych: No history of depression.   Endocrine:  Thyroid hormonal replacement.  Allergy/Immune: no recent reactions     Objective:   General Exam:  General appearance:  Elderly female in NAD.   HEENT: Sarah. EOM's intact.  Neck: Normal thyroid.   Back: No CVA tenderness.    RESP: No SOB.  Breasts: deferred  Abdomen: Benign without localizing signs.  Extremities:  varices.  Bandage on left arm  Lymphatic: noncontributory  Skin: No rashes. No lesions.  Neurologic:  Grossly intact.   Psych: Oriented.   Pelvic Exam:  V:  No lesions. No palpable nodes.   Va:  Ring pessary fits well.  Diffuse relaxation with moderate discharge from pessary  .Meatus:No caruncle or stenosis  Urethra: Non tender. No suburethral  masses.  Cx/Cuff: No masses or friability  Uterus:  No masses or tenderness  Ad: No mass or tenderness.  Levators :Symmetrical. Normal tone. Non tender.  BL: Non tender  RV:  External hemorrhoids.  Assessment:   Worsening prolapse and more difficulty dealing with the pessary because of age and infirmity. she would prefer surgery at this time and is amenable to colpocleisis and she is not sexually active.  She has some mixed incontinence and will need to be counseled on the likely continuation of these symptoms postoperatively.  We will do a bladder scan and consider CMG preoperatively to determine if she would be a candidate at all for a suburethral sling but I am hesitant because of her age and medical condition.  Since she still has a uterus we will likely do endometrial sampling at the time of the procedure to avoid hysterectomy and prolonged anesthetic       Plan:    Will schedule for colpocleisis pending surgical clearance  Bladder scan and possible simple CMG in the preop visit

## 2020-08-13 ENCOUNTER — TELEPHONE (OUTPATIENT)
Dept: UROGYNECOLOGY | Facility: CLINIC | Age: 85
End: 2020-08-13

## 2020-08-13 NOTE — TELEPHONE ENCOUNTER
----- Message from Kash Wu sent at 8/13/2020 12:01 PM CDT -----  Type: Needs Medical Advice    Who Called:  Patient  Best Call Back Number: [phone]  Additional Information: Patient would like to discuss upcoming pre-procedure. Please call to advise. Thanks!

## 2020-08-13 NOTE — TELEPHONE ENCOUNTER
Spoke with pt. She was making sure that seeing her pc on the 20th was not cutting it too close to have her sx clearance for her per op on the 24th. I advised her that that was  Fine and suggested her to bring sx clearance in hand

## 2020-08-20 PROBLEM — M50.30 DEGENERATIVE DISC DISEASE, CERVICAL: Status: ACTIVE | Noted: 2020-08-20

## 2020-08-24 ENCOUNTER — OFFICE VISIT (OUTPATIENT)
Dept: UROGYNECOLOGY | Facility: CLINIC | Age: 85
End: 2020-08-24
Payer: MEDICARE

## 2020-08-24 VITALS
BODY MASS INDEX: 21.87 KG/M2 | WEIGHT: 139.31 LBS | HEIGHT: 67 IN | SYSTOLIC BLOOD PRESSURE: 170 MMHG | DIASTOLIC BLOOD PRESSURE: 91 MMHG | HEART RATE: 85 BPM

## 2020-08-24 DIAGNOSIS — R35.0 URINARY FREQUENCY: Primary | ICD-10-CM

## 2020-08-24 DIAGNOSIS — N81.4 UTERINE PROLAPSE: ICD-10-CM

## 2020-08-24 DIAGNOSIS — N81.11 CYSTOCELE, MIDLINE: ICD-10-CM

## 2020-08-24 LAB
BILIRUB SERPL-MCNC: ABNORMAL MG/DL
BLOOD URINE, POC: ABNORMAL
CLARITY, POC UA: CLEAR
COLOR, POC UA: YELLOW
GLUCOSE UR QL STRIP: ABNORMAL
KETONES UR QL STRIP: ABNORMAL
LEUKOCYTE ESTERASE URINE, POC: ABNORMAL
NITRITE, POC UA: ABNORMAL
PH, POC UA: 5
POC RESIDUAL URINE VOLUME: 14 ML (ref 0–100)
PROTEIN, POC: ABNORMAL
SPECIFIC GRAVITY, POC UA: 1020
UROBILINOGEN, POC UA: ABNORMAL

## 2020-08-24 PROCEDURE — 99213 OFFICE O/P EST LOW 20 MIN: CPT | Mod: S$PBB,,, | Performed by: OBSTETRICS & GYNECOLOGY

## 2020-08-24 PROCEDURE — 99213 PR OFFICE/OUTPT VISIT, EST, LEVL III, 20-29 MIN: ICD-10-PCS | Mod: S$PBB,,, | Performed by: OBSTETRICS & GYNECOLOGY

## 2020-08-24 PROCEDURE — 81002 URINALYSIS NONAUTO W/O SCOPE: CPT | Mod: PBBFAC,PO | Performed by: OBSTETRICS & GYNECOLOGY

## 2020-08-24 PROCEDURE — 51798 US URINE CAPACITY MEASURE: CPT | Mod: PBBFAC,PO | Performed by: OBSTETRICS & GYNECOLOGY

## 2020-08-24 PROCEDURE — 99999 PR PBB SHADOW E&M-EST. PATIENT-LVL III: CPT | Mod: PBBFAC,,, | Performed by: OBSTETRICS & GYNECOLOGY

## 2020-08-24 PROCEDURE — 99999 PR PBB SHADOW E&M-EST. PATIENT-LVL III: ICD-10-PCS | Mod: PBBFAC,,, | Performed by: OBSTETRICS & GYNECOLOGY

## 2020-08-24 PROCEDURE — 99213 OFFICE O/P EST LOW 20 MIN: CPT | Mod: PBBFAC,PO | Performed by: OBSTETRICS & GYNECOLOGY

## 2020-08-24 RX ORDER — METRONIDAZOLE 7.5 MG/G
1 GEL VAGINAL 2 TIMES DAILY
Qty: 70 G | Refills: 2 | Status: ON HOLD | OUTPATIENT
Start: 2020-08-24 | End: 2020-09-18 | Stop reason: HOSPADM

## 2020-08-24 NOTE — PROGRESS NOTES
Subjective:     Chief Complaint:  Pelvic relaxation and prolapse  History of Present Illness: Thu Cr is a 85 y.o. female who presents for upcoming surgical procedure for prolapse/ pelvic relaxation.  She has used to pessary for number of years but is finding that is more difficult to deal with as she gets older and is less effective in holding back her prolapse.  Recently she finds it uncomfortable to wear.  She changes it once a week but has noticed some spotting when she removes it  At this point she would prefer to go directly to surgery and is desirous of colpocleisis to minimize operative time and risk.  She does have mixed incontinence and understands that procedure is not done to treat this particular problem.  She still has a uterus and understands that we will sample the uterine lining and if any abnormalities are present it could require further treatment.  Urinalysis today is normal.  She had COVID about 4 weeks ago but is presently asymptomatic and has been cleared for surgery by     Review of Systems    Constitutional:  Has had falls  Eyes: No vision changes.  ENT: No headaches.   Respiratory:  Recent COVID-19 infection  Cardiovascular:  Hyperlipidemia Hypertension  Gastrointestinal:  GERD  Genitourinary: No vaginal bleeding or discharge.  Integument/Breast: Negative  Hematologic/Lymphatic:  B12 deficiency  Musculoskeletal:  Dupuytren's contraction.  Knee replacement  Neurological:   DDD  Behavioral/Psych: No history of depression.   Endocrine:  Thyroid dysfunction  Allergy/Immune: no recent reactions     Objective:   General Exam:  General appearance: WDNF. NAD.   HEENT: Sarah. EOM's intact.  Neck: Normal thyroid.   Back: No CVA tenderness.  RESP: No SOB.  Breasts: deferred  Abdomen: Benign without localizing signs.  Extremities: No edema. No varices.  Lymphatic: noncontributory  Skin: No rashes. No lesions.  Neurologic: Intact.   Psych: Oriented.   Pelvic Exam:  V:  No lesions. No  palpable nodes.   Va:  Abrasion with some granulation tissue on the left vaginal wall near the cuff.  Silver nitrate was applied and a smaller, 4.  Pessary was placed.  .Meatus:No caruncle or stenosis  Urethra: Non tender. No suburethral masses.  Cx/Cuff: No abnormalities no  Uterus:  Procidentia with removal of pessary  Ad: No mass or tenderness.  Levators :Symmetrical. Normal tone.  Poor contractions Non tender.  BL: Non tender  RV:  External hemorrhoids.  Assessment:   Abrasion in granulation tissue from pessary.  We will need to postpone the surgery but she says she cannot tolerate going without the pessary so we will try MetroGel along with a smaller, 4.  Ring pessary to see if this will improve the condition.  She understands the importance of minimizing anesthesia time and complexity of the surgery     Procedure note; bladder scan; residual was 14 mL  Plan:    Reschedule surgery to include uterine sampling and colpocleisis  MetroGel

## 2020-08-28 DIAGNOSIS — Z01.818 PRE-OP TESTING: Primary | ICD-10-CM

## 2020-09-02 ENCOUNTER — DOCUMENT SCAN (OUTPATIENT)
Dept: HOME HEALTH SERVICES | Facility: HOSPITAL | Age: 85
End: 2020-09-02
Payer: MEDICARE

## 2020-09-09 ENCOUNTER — TELEPHONE (OUTPATIENT)
Dept: UROGYNECOLOGY | Facility: CLINIC | Age: 85
End: 2020-09-09

## 2020-09-09 NOTE — TELEPHONE ENCOUNTER
----- Message from Margot Rowley sent at 9/9/2020 10:52 AM CDT -----  Contact: self  Type: Needs Medical Advice  Who Called:  patient    Best Call Back Number: [phone]  Additional Information: requesting a return call concerning her pre op appointments

## 2020-09-11 NOTE — PRE-PROCEDURE INSTRUCTIONS
Patient was called to give information regarding medication and  pre-procedure instructions. Information left on voicemail. Pt instructed to call back to verify receiving information.

## 2020-09-14 ENCOUNTER — HOSPITAL ENCOUNTER (OUTPATIENT)
Dept: PREADMISSION TESTING | Facility: HOSPITAL | Age: 85
Discharge: HOME | End: 2020-09-14
Attending: OBSTETRICS & GYNECOLOGY
Payer: MEDICARE

## 2020-09-14 ENCOUNTER — OFFICE VISIT (OUTPATIENT)
Dept: UROGYNECOLOGY | Facility: CLINIC | Age: 85
End: 2020-09-14
Payer: MEDICARE

## 2020-09-14 VITALS
BODY MASS INDEX: 21.73 KG/M2 | DIASTOLIC BLOOD PRESSURE: 81 MMHG | HEART RATE: 77 BPM | WEIGHT: 138.44 LBS | HEIGHT: 67 IN | SYSTOLIC BLOOD PRESSURE: 145 MMHG

## 2020-09-14 DIAGNOSIS — N39.3 URINARY, INCONTINENCE, STRESS FEMALE: Primary | ICD-10-CM

## 2020-09-14 DIAGNOSIS — N81.89 PELVIC RELAXATION: ICD-10-CM

## 2020-09-14 DIAGNOSIS — N81.3 UTERINE PROCIDENTIA: Primary | ICD-10-CM

## 2020-09-14 PROCEDURE — 99900104 DSU ONLY-NO CHARGE-EA ADD'L HR (STAT)

## 2020-09-14 PROCEDURE — 99900103 DSU ONLY-NO CHARGE-INITIAL HR (STAT)

## 2020-09-14 PROCEDURE — 99999 PR PBB SHADOW E&M-EST. PATIENT-LVL III: CPT | Mod: PBBFAC,,, | Performed by: OBSTETRICS & GYNECOLOGY

## 2020-09-14 PROCEDURE — 99213 PR OFFICE/OUTPT VISIT, EST, LEVL III, 20-29 MIN: ICD-10-PCS | Mod: S$PBB,,, | Performed by: OBSTETRICS & GYNECOLOGY

## 2020-09-14 PROCEDURE — 99999 PR PBB SHADOW E&M-EST. PATIENT-LVL III: ICD-10-PCS | Mod: PBBFAC,,, | Performed by: OBSTETRICS & GYNECOLOGY

## 2020-09-14 PROCEDURE — 99213 OFFICE O/P EST LOW 20 MIN: CPT | Mod: PBBFAC,PO | Performed by: OBSTETRICS & GYNECOLOGY

## 2020-09-14 PROCEDURE — 99213 OFFICE O/P EST LOW 20 MIN: CPT | Mod: S$PBB,,, | Performed by: OBSTETRICS & GYNECOLOGY

## 2020-09-14 NOTE — PROGRESS NOTES
Subjective:     Chief Complaint:  Uterine prolapse  History of Present Illness: Thu Cr is a 85 y.o. female who presents for uterine procidentia and diffuse relaxation.  She has been wearing a pessary but is now desirous of definitive surgical therapy.  She had an abrasion granulation tissue from the pessary and we tried to remove it but she was unable to leave it out because of the discomfort of prolapse.  She has mixed incontinence and understands that that will need to be treated postop.  She understands that although she has had no abnormal Pap smears of other uterine problems we will do uterine sampling prior to doing colpocleisis which is being done to shorten the operative time and anesthesia time    Review of Systems    Constitutional:  Has surgical clearance  Eyes:  Cataracts and macular degeneration  ENT: No headaches.   Respiratory:  Previously had COVID/ recovered well  Cardiovascular:  Hyperlipidemia Hypertension  Gastrointestinal:  GERD  Genitourinary: No vaginal bleeding or discharge.  Integument/Breast: Negative  Hematologic/Lymphatic:  B12 deficient  Musculoskeletal:  Knee replaced  Neurological: DDD  Behavioral/Psych:  Insomnia  Endocrine:  Hypothyroidism   Allergy/Immune: no recent reactions     Objective:   General Exam:  General appearance: WDNF. NAD.   HEENT: Sarah. EOM's intact.  Neck: Normal thyroid.   Back: No CVA tenderness.  RESP: No SOB.  Breasts: deferred  Abdomen: Benign without localizing signs.  Extremities:  varices.  Lymphatic: noncontributory  Skin:  Fragile skin  Neurologic: Intact.   Psych: Oriented and alert  Pelvic Exam:  V:  No lesions. No palpable nodes.   Va:  Diffuse laxity and relaxation..  Granulation tissue and abrasion  .Meatus:  Mild posterior eversion  Urethra: Non tender. No suburethral masses.  Cx/Cuff: No lesions  Uterus:  Procidentia  Ad: No mass or tenderness.  Levators :Symmetrical.  Poor contraction Normal tone. Non tender.  BL: Non tender  RV:  External  hemorrhoids.  Assessment:   Procidentia  diffuse relaxation.  Discomfort and abrasions and granulation tissue from pessaries  Mixed incontinence with dominant urge and urge incontinence   Procedure, risks and complications discussed with patient and her daughter    Plan:    Endometrial sampling.  Colpocleisis

## 2020-09-14 NOTE — DISCHARGE INSTRUCTIONS
To confirm, Your doctor has instructed you that surgery is scheduled for: 9/17/20      Please report to [facility], Registration the morning of surgery. You must check-in and receive a wristband before going to your procedure.    Pre-Op will call the afternoon prior to surgery between 1:00 and 6:00 PM with the final arrival time.  Phone number: [phone]    PLEASE NOTE:  The surgery schedule has many variables which may affect the time of your surgery case.  Family members should be available if your surgery time changes.  Plan to be here the day of your procedure between 4-6 hours.    MEDICATIONS:  TAKE ONLY THESE MEDICATIONS WITH A SMALL SIP OF WATER THE MORNING OF YOUR PROCEDURE:    Fluoxetine, levothyroxine, Pantoprazole    DO NOT TAKE THESE MEDICATIONS 5-7 DAYS PRIOR to your procedure or per your surgeon's request: ASPIRIN, ALEVE, ADVIL, IBUPROFEN, FISH OIL VITAMIN E,9Multivitamin) (I caps) HERBALS  (May take Tylenol)                                                ONLY if you are prescribed any types of blood thinners such as:  Aspirin, Coumadin, Plavix, Pradaxa, Xarelto, Aggrenox, Effient, Eliquis, Savasya, Brilinta, or any other, ask your surgeon whether you should stop taking them and how long before surgery you should stop.  You may also need to verify with the prescribing physician if it is ok to stop your medication.      INSTRUCTIONS IMPORTANT!!  · Do not eat or drink anything between midnight and the time of your procedure- this includes gum, mints, and candy.  · Do not smoke or drink alcoholic beverages 24 hours prior to your procedure.  · Shower the night before AND the morning of your procedure with a Chlorhexidine wash such as Hibiclens or Dial antibacterial soap from the neck down.  Do not get it on your face or in your eyes.  You may use your own shampoo and face wash. This helps your skin to be as bacteria free as possible.    · If you wear contact lenses, dentures,  hearing aids or glasses, bring a container to put them in during surgery and give to a family member for safe keeping.  Please leave all jewelry, piercing's and valuables at home.   · DO NOT remove hair from the surgery site.  Do not shave the incision site unless you are given specific instructions to do so.    · ONLY if you have been diagnosed with sleep apnea please bring your C-PAP machine.  · ONLY if you wear home oxygen please bring your portable oxygen tank the day of your procedure.  · ONLY if you have a history of OPEN HEART SURGERY you will need a clearance from your Cardiologist per Anesthesia.      · ONLY for patients requiring bowel prep, written instructions will be given by your doctor's office.  · ONLY if you have a neuro stimulator, please bring the controller with you the morning of surgery  · ONLY if a type and screen test is needed before surgery, please return:  · If your doctor has scheduled you for an overnight stay, bring a small overnight bag with any personal items you need.  · Make arrangements in advance for transportation home by a responsible adult.  It is not safe to drive a vehicle during the 24 hours after anesthesia.     · ONLY ONE VISITOR PER PATIENT IS ALLOWED TO COME IN THE HOSPITAL THE DAY OF PROCEDURE.   · Visiting hours are 8:00AM-6:00PM. For the safety of all patients, visitors under the age of 12 are not allowed above the first floor of the hospital.    · All [facility] facilities and properties are tobacco free.  Smoking is NOT allowed.       If you have any questions about these instructions, call Pre-Op Admit  Nursing at [phone] or the Pre-Op Day Surgery Unit at [phone].

## 2020-09-16 ENCOUNTER — ANESTHESIA EVENT (OUTPATIENT)
Dept: SURGERY | Facility: HOSPITAL | Age: 85
End: 2020-09-16
Payer: MEDICARE

## 2020-09-16 RX ORDER — SODIUM CHLORIDE, SODIUM LACTATE, POTASSIUM CHLORIDE, CALCIUM CHLORIDE 600; 310; 30; 20 MG/100ML; MG/100ML; MG/100ML; MG/100ML
INJECTION, SOLUTION INTRAVENOUS CONTINUOUS
Status: CANCELLED | OUTPATIENT
Start: 2020-09-16

## 2020-09-17 ENCOUNTER — ANESTHESIA (OUTPATIENT)
Dept: SURGERY | Facility: HOSPITAL | Age: 85
End: 2020-09-17
Payer: MEDICARE

## 2020-09-17 ENCOUNTER — HOSPITAL ENCOUNTER (OUTPATIENT)
Facility: HOSPITAL | Age: 85
Discharge: HOME | End: 2020-09-18
Attending: OBSTETRICS & GYNECOLOGY | Admitting: OBSTETRICS & GYNECOLOGY
Payer: MEDICARE

## 2020-09-17 DIAGNOSIS — R35.0 URINARY FREQUENCY: ICD-10-CM
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
## 2020-09-17 DIAGNOSIS — N39.3 URINARY, INCONTINENCE, STRESS FEMALE: ICD-10-CM

## 2020-09-17 DIAGNOSIS — M50.30 DEGENERATIVE DISC DISEASE, CERVICAL: ICD-10-CM

## 2020-09-17 DIAGNOSIS — H26.9 BILATERAL CATARACTS: ICD-10-CM

## 2020-09-17 DIAGNOSIS — N81.89 PELVIC RELAXATION: ICD-10-CM

## 2020-09-17 DIAGNOSIS — H35.30 ARMD (AGE RELATED MACULAR DEGENERATION): ICD-10-CM

## 2020-09-17 DIAGNOSIS — N81.3 UTERINE PROCIDENTIA: Primary | ICD-10-CM

## 2020-09-17 DIAGNOSIS — E78.5 HLD (HYPERLIPIDEMIA): ICD-10-CM

## 2020-09-17 LAB
ANION GAP SERPL CALC-SCNC: 8 MMOL/L (ref 8–16)
BUN SERPL-MCNC: 11 MG/DL (ref 8–23)
CALCIUM SERPL-MCNC: 9.2 MG/DL (ref 8.7–10.5)
CHLORIDE SERPL-SCNC: 105 MMOL/L (ref 95–110)
CO2 SERPL-SCNC: 28 MMOL/L (ref 23–29)
CREAT SERPL-MCNC: 0.7 MG/DL (ref 0.5–1.4)
EST. GFR  (AFRICAN AMERICAN): >60 ML/MIN/1.73 M^2
EST. GFR  (NON AFRICAN AMERICAN): >60 ML/MIN/1.73 M^2
GLUCOSE SERPL-MCNC: 88 MG/DL (ref 70–110)
POTASSIUM SERPL-SCNC: 4.1 MMOL/L (ref 3.5–5.1)
SODIUM SERPL-SCNC: 141 MMOL/L (ref 136–145)

## 2020-09-17 PROCEDURE — 36000706: Performed by: OBSTETRICS & GYNECOLOGY

## 2020-09-17 PROCEDURE — 99900103 DSU ONLY-NO CHARGE-INITIAL HR (STAT): Performed by: OBSTETRICS & GYNECOLOGY

## 2020-09-17 PROCEDURE — 25000003 PHARM REV CODE 250: Performed by: ANESTHESIOLOGY

## 2020-09-17 PROCEDURE — 88305 TISSUE EXAM BY PATHOLOGIST: CPT | Mod: 26,,, | Performed by: PATHOLOGY

## 2020-09-17 PROCEDURE — 94761 N-INVAS EAR/PLS OXIMETRY MLT: CPT

## 2020-09-17 PROCEDURE — 57260 CMBN ANT PST COLPRHY: CPT | Mod: ,,, | Performed by: OBSTETRICS & GYNECOLOGY

## 2020-09-17 PROCEDURE — D9220A PRA ANESTHESIA: Mod: ANES,,, | Performed by: ANESTHESIOLOGY

## 2020-09-17 PROCEDURE — 63600175 PHARM REV CODE 636 W HCPCS: Performed by: ANESTHESIOLOGY

## 2020-09-17 PROCEDURE — 88305 TISSUE EXAM BY PATHOLOGIST: ICD-10-PCS | Mod: 26,,, | Performed by: PATHOLOGY

## 2020-09-17 PROCEDURE — 71000033 HC RECOVERY, INTIAL HOUR: Performed by: OBSTETRICS & GYNECOLOGY

## 2020-09-17 PROCEDURE — D9220A PRA ANESTHESIA: ICD-10-PCS | Mod: CRNA,,, | Performed by: NURSE ANESTHETIST, CERTIFIED REGISTERED

## 2020-09-17 PROCEDURE — 80048 BASIC METABOLIC PNL TOTAL CA: CPT

## 2020-09-17 PROCEDURE — 63600175 PHARM REV CODE 636 W HCPCS: Performed by: NURSE ANESTHETIST, CERTIFIED REGISTERED

## 2020-09-17 PROCEDURE — 57260 PR COMBINED ANT/POST COLPORRHAPHY: ICD-10-PCS | Mod: ,,, | Performed by: OBSTETRICS & GYNECOLOGY

## 2020-09-17 PROCEDURE — 37000008 HC ANESTHESIA 1ST 15 MINUTES: Performed by: OBSTETRICS & GYNECOLOGY

## 2020-09-17 PROCEDURE — 25000003 PHARM REV CODE 250: Performed by: NURSE ANESTHETIST, CERTIFIED REGISTERED

## 2020-09-17 PROCEDURE — D9220A PRA ANESTHESIA: ICD-10-PCS | Mod: ANES,,, | Performed by: ANESTHESIOLOGY

## 2020-09-17 PROCEDURE — 27000221 HC OXYGEN, UP TO 24 HOURS

## 2020-09-17 PROCEDURE — 36415 COLL VENOUS BLD VENIPUNCTURE: CPT

## 2020-09-17 PROCEDURE — 37000009 HC ANESTHESIA EA ADD 15 MINS: Performed by: OBSTETRICS & GYNECOLOGY

## 2020-09-17 PROCEDURE — 88305 TISSUE EXAM BY PATHOLOGIST: CPT | Performed by: PATHOLOGY

## 2020-09-17 PROCEDURE — 25000003 PHARM REV CODE 250: Performed by: OBSTETRICS & GYNECOLOGY

## 2020-09-17 PROCEDURE — 36000707: Performed by: OBSTETRICS & GYNECOLOGY

## 2020-09-17 PROCEDURE — 94799 UNLISTED PULMONARY SVC/PX: CPT

## 2020-09-17 PROCEDURE — C2627 CATH, SUPRAPUBIC/CYSTOSCOPIC: HCPCS | Performed by: OBSTETRICS & GYNECOLOGY

## 2020-09-17 PROCEDURE — 71000039 HC RECOVERY, EACH ADD'L HOUR: Performed by: OBSTETRICS & GYNECOLOGY

## 2020-09-17 PROCEDURE — 63600175 PHARM REV CODE 636 W HCPCS: Performed by: OBSTETRICS & GYNECOLOGY

## 2020-09-17 PROCEDURE — 99900104 DSU ONLY-NO CHARGE-EA ADD'L HR (STAT): Performed by: OBSTETRICS & GYNECOLOGY

## 2020-09-17 PROCEDURE — D9220A PRA ANESTHESIA: Mod: CRNA,,, | Performed by: NURSE ANESTHETIST, CERTIFIED REGISTERED

## 2020-09-17 RX ORDER — FENTANYL CITRATE 50 UG/ML
25 INJECTION, SOLUTION INTRAMUSCULAR; INTRAVENOUS EVERY 5 MIN PRN
Status: COMPLETED | OUTPATIENT
Start: 2020-09-17 | End: 2020-09-17

## 2020-09-17 RX ORDER — GABAPENTIN 300 MG/1
300 CAPSULE ORAL 2 TIMES DAILY
Status: COMPLETED | OUTPATIENT
Start: 2020-09-17 | End: 2020-09-18

## 2020-09-17 RX ORDER — ROCURONIUM BROMIDE 10 MG/ML
INJECTION, SOLUTION INTRAVENOUS
Status: DISCONTINUED | OUTPATIENT
Start: 2020-09-17 | End: 2020-09-17

## 2020-09-17 RX ORDER — ACETAMINOPHEN 10 MG/ML
INJECTION, SOLUTION INTRAVENOUS
Status: DISCONTINUED | OUTPATIENT
Start: 2020-09-17 | End: 2020-09-17

## 2020-09-17 RX ORDER — MORPHINE SULFATE 2 MG/ML
4 INJECTION, SOLUTION INTRAMUSCULAR; INTRAVENOUS
Status: DISCONTINUED | OUTPATIENT
Start: 2020-09-17 | End: 2020-09-18 | Stop reason: HOSPADM

## 2020-09-17 RX ORDER — SUCRALFATE 1 G/10ML
1 SUSPENSION ORAL EVERY 6 HOURS
Status: DISCONTINUED | OUTPATIENT
Start: 2020-09-17 | End: 2020-09-18 | Stop reason: HOSPADM

## 2020-09-17 RX ORDER — ONDANSETRON 2 MG/ML
4 INJECTION INTRAMUSCULAR; INTRAVENOUS ONCE AS NEEDED
Status: DISCONTINUED | OUTPATIENT
Start: 2020-09-17 | End: 2020-09-17 | Stop reason: HOSPADM

## 2020-09-17 RX ORDER — LIDOCAINE HYDROCHLORIDE 10 MG/ML
1 INJECTION, SOLUTION EPIDURAL; INFILTRATION; INTRACAUDAL; PERINEURAL ONCE
Status: COMPLETED | OUTPATIENT
Start: 2020-09-17 | End: 2020-09-17

## 2020-09-17 RX ORDER — LIDOCAINE HYDROCHLORIDE 20 MG/ML
JELLY TOPICAL
Status: DISCONTINUED | OUTPATIENT
Start: 2020-09-17 | End: 2020-09-17 | Stop reason: HOSPADM

## 2020-09-17 RX ORDER — MAG HYDROX/ALUMINUM HYD/SIMETH 200-200-20
30 SUSPENSION, ORAL (FINAL DOSE FORM) ORAL
Status: DISCONTINUED | OUTPATIENT
Start: 2020-09-17 | End: 2020-09-18 | Stop reason: HOSPADM

## 2020-09-17 RX ORDER — DOCUSATE SODIUM 100 MG/1
100 CAPSULE, LIQUID FILLED ORAL 2 TIMES DAILY
Status: DISCONTINUED | OUTPATIENT
Start: 2020-09-17 | End: 2020-09-18 | Stop reason: HOSPADM

## 2020-09-17 RX ORDER — FENTANYL CITRATE 50 UG/ML
INJECTION, SOLUTION INTRAMUSCULAR; INTRAVENOUS
Status: DISCONTINUED | OUTPATIENT
Start: 2020-09-17 | End: 2020-09-17

## 2020-09-17 RX ORDER — SUCCINYLCHOLINE CHLORIDE 20 MG/ML
INJECTION INTRAMUSCULAR; INTRAVENOUS
Status: DISCONTINUED | OUTPATIENT
Start: 2020-09-17 | End: 2020-09-17

## 2020-09-17 RX ORDER — LIDOCAINE HCL/PF 100 MG/5ML
SYRINGE (ML) INTRAVENOUS
Status: DISCONTINUED | OUTPATIENT
Start: 2020-09-17 | End: 2020-09-17

## 2020-09-17 RX ORDER — EPHEDRINE SULFATE 50 MG/ML
INJECTION, SOLUTION INTRAVENOUS
Status: DISCONTINUED | OUTPATIENT
Start: 2020-09-17 | End: 2020-09-17

## 2020-09-17 RX ORDER — ATROPA BELLADONNA AND OPIUM 16.2; 3 MG/1; MG/1
SUPPOSITORY RECTAL
Status: DISCONTINUED | OUTPATIENT
Start: 2020-09-17 | End: 2020-09-17 | Stop reason: HOSPADM

## 2020-09-17 RX ORDER — CEFAZOLIN SODIUM 2 G/50ML
2 SOLUTION INTRAVENOUS
Status: COMPLETED | OUTPATIENT
Start: 2020-09-17 | End: 2020-09-17

## 2020-09-17 RX ORDER — DEXAMETHASONE SODIUM PHOSPHATE 4 MG/ML
INJECTION, SOLUTION INTRA-ARTICULAR; INTRALESIONAL; INTRAMUSCULAR; INTRAVENOUS; SOFT TISSUE
Status: DISCONTINUED | OUTPATIENT
Start: 2020-09-17 | End: 2020-09-17

## 2020-09-17 RX ORDER — PROPOFOL 10 MG/ML
VIAL (ML) INTRAVENOUS
Status: DISCONTINUED | OUTPATIENT
Start: 2020-09-17 | End: 2020-09-17

## 2020-09-17 RX ORDER — PANTOPRAZOLE SODIUM 40 MG/1
40 TABLET, DELAYED RELEASE ORAL DAILY
Status: DISCONTINUED | OUTPATIENT
Start: 2020-09-18 | End: 2020-09-18 | Stop reason: HOSPADM

## 2020-09-17 RX ORDER — ONDANSETRON HYDROCHLORIDE 2 MG/ML
INJECTION, SOLUTION INTRAMUSCULAR; INTRAVENOUS
Status: DISCONTINUED | OUTPATIENT
Start: 2020-09-17 | End: 2020-09-17

## 2020-09-17 RX ORDER — LEVOTHYROXINE SODIUM 125 UG/1
125 TABLET ORAL
Status: DISCONTINUED | OUTPATIENT
Start: 2020-09-18 | End: 2020-09-18 | Stop reason: HOSPADM

## 2020-09-17 RX ORDER — IBUPROFEN 600 MG/1
600 TABLET ORAL EVERY 6 HOURS
Status: DISCONTINUED | OUTPATIENT
Start: 2020-09-18 | End: 2020-09-18 | Stop reason: HOSPADM

## 2020-09-17 RX ORDER — BUPIVACAINE HYDROCHLORIDE AND EPINEPHRINE 2.5; 5 MG/ML; UG/ML
INJECTION, SOLUTION EPIDURAL; INFILTRATION; INTRACAUDAL; PERINEURAL
Status: DISCONTINUED | OUTPATIENT
Start: 2020-09-17 | End: 2020-09-17 | Stop reason: HOSPADM

## 2020-09-17 RX ORDER — OXYCODONE HYDROCHLORIDE 5 MG/1
5 TABLET ORAL ONCE AS NEEDED
Status: COMPLETED | OUTPATIENT
Start: 2020-09-17 | End: 2020-09-17

## 2020-09-17 RX ORDER — PHENAZOPYRIDINE HYDROCHLORIDE 100 MG/1
200 TABLET, FILM COATED ORAL EVERY 6 HOURS PRN
Status: DISCONTINUED | OUTPATIENT
Start: 2020-09-17 | End: 2020-09-18 | Stop reason: HOSPADM

## 2020-09-17 RX ORDER — FLUOXETINE 10 MG/1
10 CAPSULE ORAL DAILY
Status: DISCONTINUED | OUTPATIENT
Start: 2020-09-18 | End: 2020-09-18 | Stop reason: HOSPADM

## 2020-09-17 RX ORDER — DIPHENHYDRAMINE HYDROCHLORIDE 50 MG/ML
25 INJECTION INTRAMUSCULAR; INTRAVENOUS EVERY 4 HOURS PRN
Status: DISCONTINUED | OUTPATIENT
Start: 2020-09-17 | End: 2020-09-18 | Stop reason: HOSPADM

## 2020-09-17 RX ORDER — ONDANSETRON 4 MG/1
8 TABLET, ORALLY DISINTEGRATING ORAL EVERY 8 HOURS PRN
Status: DISCONTINUED | OUTPATIENT
Start: 2020-09-17 | End: 2020-09-18 | Stop reason: HOSPADM

## 2020-09-17 RX ORDER — ZOLPIDEM TARTRATE 5 MG/1
5 TABLET ORAL NIGHTLY PRN
Status: DISCONTINUED | OUTPATIENT
Start: 2020-09-17 | End: 2020-09-18 | Stop reason: HOSPADM

## 2020-09-17 RX ORDER — OXYCODONE AND ACETAMINOPHEN 5; 325 MG/1; MG/1
1 TABLET ORAL EVERY 4 HOURS PRN
Status: DISCONTINUED | OUTPATIENT
Start: 2020-09-17 | End: 2020-09-18 | Stop reason: HOSPADM

## 2020-09-17 RX ADMIN — SODIUM CHLORIDE, SODIUM GLUCONATE, SODIUM ACETATE, POTASSIUM CHLORIDE, MAGNESIUM CHLORIDE, SODIUM PHOSPHATE, DIBASIC, AND POTASSIUM PHOSPHATE: .53; .5; .37; .037; .03; .012; .00082 INJECTION, SOLUTION INTRAVENOUS at 01:09

## 2020-09-17 RX ADMIN — ROCURONIUM BROMIDE 5 MG: 10 INJECTION, SOLUTION INTRAVENOUS at 01:09

## 2020-09-17 RX ADMIN — SUCRALFATE 1 G: 1 SUSPENSION ORAL at 06:09

## 2020-09-17 RX ADMIN — OXYCODONE HYDROCHLORIDE 5 MG: 5 TABLET ORAL at 03:09

## 2020-09-17 RX ADMIN — EPHEDRINE SULFATE 25 MG: 50 INJECTION, SOLUTION INTRAMUSCULAR; INTRAVENOUS; SUBCUTANEOUS at 01:09

## 2020-09-17 RX ADMIN — FENTANYL CITRATE 25 MCG: 50 INJECTION, SOLUTION INTRAMUSCULAR; INTRAVENOUS at 03:09

## 2020-09-17 RX ADMIN — FENTANYL CITRATE 25 MCG: 50 INJECTION, SOLUTION INTRAMUSCULAR; INTRAVENOUS at 04:09

## 2020-09-17 RX ADMIN — FENTANYL CITRATE 50 MCG: 50 INJECTION, SOLUTION INTRAMUSCULAR; INTRAVENOUS at 01:09

## 2020-09-17 RX ADMIN — LIDOCAINE HYDROCHLORIDE 75 MG: 20 INJECTION, SOLUTION INTRAVENOUS at 01:09

## 2020-09-17 RX ADMIN — ONDANSETRON 4 MG: 2 INJECTION, SOLUTION INTRAMUSCULAR; INTRAVENOUS at 01:09

## 2020-09-17 RX ADMIN — PROPOFOL 80 MG: 10 INJECTION, EMULSION INTRAVENOUS at 01:09

## 2020-09-17 RX ADMIN — GABAPENTIN 300 MG: 300 CAPSULE ORAL at 06:09

## 2020-09-17 RX ADMIN — LIDOCAINE HYDROCHLORIDE: 10 INJECTION, SOLUTION EPIDURAL; INFILTRATION; INTRACAUDAL; PERINEURAL at 10:09

## 2020-09-17 RX ADMIN — DEXAMETHASONE SODIUM PHOSPHATE 4 MG: 4 INJECTION, SOLUTION INTRA-ARTICULAR; INTRALESIONAL; INTRAMUSCULAR; INTRAVENOUS; SOFT TISSUE at 01:09

## 2020-09-17 RX ADMIN — SUCCINYLCHOLINE CHLORIDE 100 MG: 20 INJECTION, SOLUTION INTRAMUSCULAR; INTRAVENOUS; PARENTERAL at 01:09

## 2020-09-17 RX ADMIN — SODIUM CHLORIDE, SODIUM GLUCONATE, SODIUM ACETATE, POTASSIUM CHLORIDE, MAGNESIUM CHLORIDE, SODIUM PHOSPHATE, DIBASIC, AND POTASSIUM PHOSPHATE: .53; .5; .37; .037; .03; .012; .00082 INJECTION, SOLUTION INTRAVENOUS at 10:09

## 2020-09-17 RX ADMIN — DOCUSATE SODIUM 100 MG: 100 CAPSULE, LIQUID FILLED ORAL at 09:09

## 2020-09-17 RX ADMIN — ACETAMINOPHEN 750 MG: 10 INJECTION, SOLUTION INTRAVENOUS at 01:09

## 2020-09-17 RX ADMIN — OXYCODONE HYDROCHLORIDE AND ACETAMINOPHEN 1 TABLET: 5; 325 TABLET ORAL at 07:09

## 2020-09-17 RX ADMIN — ALUMINUM HYDROXIDE, MAGNESIUM HYDROXIDE, AND SIMETHICONE 30 ML: 200; 200; 20 SUSPENSION ORAL at 06:09

## 2020-09-17 RX ADMIN — CEFAZOLIN SODIUM 2 G: 2 SOLUTION INTRAVENOUS at 01:09

## 2020-09-17 RX ADMIN — ZOLPIDEM TARTRATE 5 MG: 5 TABLET, COATED ORAL at 09:09

## 2020-09-17 NOTE — TRANSFER OF CARE
Anesthesia Transfer of Care Note    Patient: hTu Cr    Procedure(s) Performed: Procedure(s) (LRB):  COLPOCLEISIS (N/A)  COLPORRHAPHY (N/A)  DILATION AND CURETTAGE, UTERUS (N/A)  CYSTOSCOPY (N/A)    Patient location: PACU    Anesthesia Type: general    Transport from OR: Transported from OR on room air with adequate spontaneous ventilation    Post pain: adequate analgesia    Post assessment: no apparent anesthetic complications and tolerated procedure well    Post vital signs: stable    Level of consciousness: awake, alert and oriented    Nausea/Vomiting: no nausea/vomiting    Complications: none    Transfer of care protocol was followed      Last vitals:   Visit Vitals  BP (!) 160/80 (BP Location: Left arm, Patient Position: Lying)   Pulse 63   Temp 36.8 °C (98.2 °F) (Skin)   Resp 18   SpO2 99%   Breastfeeding No

## 2020-09-17 NOTE — ANESTHESIA PROCEDURE NOTES
Intubation  Performed by: Ravi Mane CRNA  Authorized by: Josse Machuca MD     Intubation:     Induction:  Intravenous    Intubated:  Postinduction    Mask Ventilation:  Easy mask    Attempts:  1    Attempted By:  CRNA    Method of Intubation:  Video laryngoscopy    Laryngeal View Grade: Grade I - full view of chords      Difficult Airway Encountered?: No      Complications:  None    Airway Device:  Oral endotracheal tube    Airway Device Size:  7.0    Style/Cuff Inflation:  Cuffed (inflated to minimal occlusive pressure)    Tube secured:  19    Secured at:  The lips    Placement Verified By:  Capnometry    Complicating Factors:  None    Findings Post-Intubation:  BS equal bilateral  Notes:      Neutral c-spine maintained throughout ventilation and intubation

## 2020-09-17 NOTE — ANESTHESIA PREPROCEDURE EVALUATION
09/17/2020  Thu Cr is a 85 y.o., female.    Anesthesia Evaluation    I have reviewed the Patient Summary Reports.    I have reviewed the Nursing Notes. I have reviewed the NPO Status.   I have reviewed the Medications.     Review of Systems  Anesthesia Hx:  Denies Family Hx of Anesthesia complications.   Denies Personal Hx of Anesthesia complications.   Cardiovascular:   Hypertension    Hepatic/GI:   GERD    Musculoskeletal:   Arthritis  Severe cervical arthralgia Spine Disorders: cervical    Endocrine:   Hypothyroidism        Physical Exam  General:  Well nourished    Airway/Jaw/Neck:  Airway Findings: Mouth Opening: Normal Tongue: Normal  General Airway Assessment: Adult  Mallampati: II  TM Distance: Normal, at least 6 cm  Jaw/Neck Findings:  Neck ROM: Extension Decreased, Mod.     Eyes/Ears/Nose:  EYES/EARS/NOSE FINDINGS: Normal    Chest/Lungs:  Chest/Lungs Findings: Normal Respiratory Rate     Heart/Vascular:  Heart Findings: Rate: Normal  Rhythm: Regular Rhythm        Mental Status:  Mental Status Findings:  Alert and Oriented, Cooperative, Anxious         Anesthesia Plan  Type of Anesthesia, risks & benefits discussed:  Anesthesia Type:  general  Patient's Preference:   Intra-op Monitoring Plan: standard ASA monitors  Intra-op Monitoring Plan Comments:   Post Op Pain Control Plan: multimodal analgesia  Post Op Pain Control Plan Comments:   Induction:   IV  Beta Blocker:  Patient is not currently on a Beta-Blocker (No further documentation required).       Informed Consent: Patient understands risks and agrees with Anesthesia plan.  Questions answered. Anesthesia consent signed with patient.  ASA Score: 2     Day of Surgery Review of History & Physical:    H&P update referred to the surgeon.         Ready For Surgery From Anesthesia Perspective.

## 2020-09-17 NOTE — NURSING
Pt arrived to floor, daughter at bedside. VSS. Suprapubic catheter in place. Draining well. No fluids at this time. Scds on. Will continue to monitor.

## 2020-09-17 NOTE — PLAN OF CARE
VSS, pain improved and tolerable after pain meds given, suprapubic catheter in place and flowing freely, alvaro pad in place, tolerated clear liquids without N/V, dwaine SCDs on. Ok per anesthesia to transfer the pt to the floor. Pt transported via bed to room 119. Pt's daughter at the bedside with pt's belongings. Call light within reach. Report given to JOSELITO Gar.

## 2020-09-17 NOTE — BRIEF OP NOTE
[facility]  Brief Operative Note    SUMMARY     Surgery Date: 9/17/2020     Surgeon(s) and Role:     * Ashwin Hernandez MD - Primary    Assisting Surgeon: None    Pre-op Diagnosis:  Pelvic relaxation [N81.89]  Uterine procidentia  Post-op Diagnosis:  Post-Op Diagnosis Codes:     * Pelvic relaxation [N81.89]  Uterine procidentia  Procedure(s) (LRB):  COLPOCLEISIS (N/A)  COLPORRHAPHY (N/A)  DILATION AND CURETTAGE, UTERUS (N/A)  CYSTOSCOPY (N/A)  INSERTION, SUPRAPUBIC CATHETER    Anesthesia: General    Description of Procedure: as above    Description of the findings of the procedure: nml ureters.clean uterine cavity    Estimated Blood Loss:50 ml    Estimated Blood Loss has been documented.         Specimens: endometrium  Specimen (12h ago, onward)    None          [id number]

## 2020-09-17 NOTE — OP NOTE
9/17/20    Surgeon;Tony    Preop diagnosis; uterine procidentia, pelvic relaxation  Postop diagnoses; same; granulation tissue; vaginal bleeding    Procedure; colpocleisis and colporrhaphy.  D&C    Blood Loss;50 ml  Specimen; minimal endometrial tissue    Under general anesthesia the patient is prepped and draped in dorsal lithotomy position Brendan ram.  Pessary was removed prior to this.  Was some granulation tissue on the vault posteriorly and on the left side of the cervix.  This was excising the base cauterized.  The uterus sounded to 6 cm and the uterine cavity on current time she was quite clean and smooth and symmetrical.   minimal tissue was obtained.  The anterior and posterior vaginal walls were injected with local and a 15 blade was used to excise the mucosa both anteriorly and posteriorly with sharp and blunt dissection midline plication was flown to reduced the blood of the rectocele and cystocele in colpocleisis was performed with to 0 Vicryl suture with successive pennie of interrupted sutures the length of the vagina anterior to posterior a good anatomic result was obtained with good hemostasis and lateral vaginal canal was sounded and more patent there was no bleeding or clots obtained rectal exam was confirmatory and a B&O suppository was placed.  Cystoscopy was performed 70 degree scope.  Ureters function normally lumen of the bladder was normal.  Bonano catheter was inserted by trocar technique and sutured in place with nylon.  The patient was awakened in the operating room and sent to recovery in stable condition.  All needle lap and instrument counts were correct

## 2020-09-17 NOTE — PLAN OF CARE
Pt daughter Muna was not in the waiting area so I called her and updated her regarding her mom's conditions  Pt awake and calm, sipping on water,  c/o pressure in the vaginal area

## 2020-09-17 NOTE — INTERVAL H&P NOTE
The patient has been examined and the H&P has been reviewed:    I concur with the findings and no changes have occurred since H&P was written.    Surgery risks, benefits and alternative options discussed and understood by patient/family.          Active Hospital Problems    Diagnosis  POA    Urinary frequency [R35.0]  Yes      Resolved Hospital Problems   No resolved problems to display.

## 2020-09-17 NOTE — CARE UPDATE
09/17/20 1713   Patient Assessment/Suction   Level of Consciousness (AVPU) alert   Respiratory Effort Normal;Unlabored   PRE-TX-O2   O2 Device (Oxygen Therapy) nasal cannula   $ Is the patient on Low Flow Oxygen? Yes   Flow (L/min) 2   Oxygen Concentration (%) 28   SpO2 96 %   Pulse Oximetry Type Intermittent   $ Pulse Oximetry - Multiple Charge Pulse Oximetry - Multiple   Pulse 89   Resp 18   Incentive Spirometer   $ Incentive Spirometer Charges done with encouragement   Administration (IS) instruction provided, initial   Number of Repetitions (IS) 10   Level Incentive Spirometer (mL) 1500   Patient Tolerance (IS) good

## 2020-09-18 LAB
FINAL PATHOLOGIC DIAGNOSIS: NORMAL
GROSS: NORMAL

## 2020-09-18 PROCEDURE — 25000003 PHARM REV CODE 250: Performed by: OBSTETRICS & GYNECOLOGY

## 2020-09-18 PROCEDURE — 94761 N-INVAS EAR/PLS OXIMETRY MLT: CPT

## 2020-09-18 PROCEDURE — 94799 UNLISTED PULMONARY SVC/PX: CPT

## 2020-09-18 RX ORDER — HYDROCODONE BITARTRATE AND ACETAMINOPHEN 5; 325 MG/1; MG/1
1 TABLET ORAL EVERY 6 HOURS PRN
Qty: 24 TABLET | Refills: 0 | Status: SHIPPED | OUTPATIENT
Start: 2020-09-18 | End: 2021-03-22

## 2020-09-18 RX ADMIN — PHENAZOPYRIDINE HYDROCHLORIDE 200 MG: 100 TABLET ORAL at 04:09

## 2020-09-18 RX ADMIN — ALUMINUM HYDROXIDE, MAGNESIUM HYDROXIDE, AND SIMETHICONE 30 ML: 200; 200; 20 SUSPENSION ORAL at 05:09

## 2020-09-18 RX ADMIN — PANTOPRAZOLE SODIUM 40 MG: 40 TABLET, DELAYED RELEASE ORAL at 08:09

## 2020-09-18 RX ADMIN — SUCRALFATE 1 G: 1 SUSPENSION ORAL at 12:09

## 2020-09-18 RX ADMIN — LEVOTHYROXINE SODIUM 125 MCG: 125 TABLET ORAL at 05:09

## 2020-09-18 RX ADMIN — SUCRALFATE 1 G: 1 SUSPENSION ORAL at 11:09

## 2020-09-18 RX ADMIN — GABAPENTIN 300 MG: 300 CAPSULE ORAL at 08:09

## 2020-09-18 RX ADMIN — FLUOXETINE 10 MG: 10 CAPSULE ORAL at 10:09

## 2020-09-18 RX ADMIN — DOCUSATE SODIUM 100 MG: 100 CAPSULE, LIQUID FILLED ORAL at 08:09

## 2020-09-18 RX ADMIN — OXYCODONE HYDROCHLORIDE AND ACETAMINOPHEN 1 TABLET: 5; 325 TABLET ORAL at 07:09

## 2020-09-18 RX ADMIN — ALUMINUM HYDROXIDE, MAGNESIUM HYDROXIDE, AND SIMETHICONE 30 ML: 200; 200; 20 SUSPENSION ORAL at 10:09

## 2020-09-18 RX ADMIN — SUCRALFATE 1 G: 1 SUSPENSION ORAL at 05:09

## 2020-09-18 NOTE — PLAN OF CARE
Pt lives alone and is established with [facility].  She wishes to resume care with them.  She uses a cane and her son lives down the street and checks on often. Pt choice signed and placed in chart.    PCP= Gwen Finley   RX= Eliazar Casanova     09/18/20 0909   Discharge Assessment   Assessment Type Discharge Planning Assessment   Confirmed/corrected address and phone number on facesheet? Yes   Assessment information obtained from? Patient;Medical Record   Communicated expected length of stay with patient/caregiver no   Prior to hospitilization cognitive status: Alert/Oriented   Prior to hospitalization functional status: Needs Assistance   Current cognitive status: Alert/Oriented   Current Functional Status: Needs Assistance   Facility Arrived From: home   Lives With alone   Able to Return to Prior Arrangements yes   Is patient able to care for self after discharge? Yes   Who are your caregiver(s) and their phone number(s)? Samir Johns [phone]   Patient's perception of discharge disposition home or selfcare;home health   Readmission Within the Last 30 Days no previous admission in last 30 days   Patient currently being followed by outpatient case management? No   Patient currently receives any other outside agency services? Yes   Name and contact number of agency or person providing outside services [facility]   Is it the patient/care giver preference to resume care with the current outside agency? Yes   Equipment Currently Used at Home cane, straight   Do you have any problems affording any of your prescribed medications? No   Is the patient taking medications as prescribed? yes   Does the patient have transportation home? Yes   Transportation Anticipated family or friend will provide;car, drives self   Dialysis Name and Scheduled days n/a   Does the patient receive services at the [facility]? No   Discharge Plan A Home Health   Discharge Plan B Home Health   DME Needed Upon  Discharge  none   Patient/Family in Agreement with Plan unable to assess

## 2020-09-18 NOTE — PLAN OF CARE
I faxed the pts HH orders and HH packet to [facility] to resume pts HH services. Viky Todd LCSW     3:50- Re-faxed packet and discharge orders to [facility] at [phone]. Viky Todd LCSW       09/18/20 1247   Post-Acute Status   Post-Acute Authorization Home Health   Home Health Status Set-up Complete

## 2020-09-18 NOTE — ANESTHESIA POSTPROCEDURE EVALUATION
Anesthesia Post Evaluation    Patient: Thu Cr    Procedure(s) Performed: Procedure(s) (LRB):  COLPOCLEISIS (N/A)  COLPORRHAPHY (N/A)  DILATION AND CURETTAGE, UTERUS (N/A)  CYSTOSCOPY (N/A)  INSERTION, SUPRAPUBIC CATHETER    Final Anesthesia Type: general    Patient location during evaluation: PACU  Patient participation: Yes- Able to Participate  Level of consciousness: awake and alert  Post-procedure vital signs: reviewed and stable  Pain management: adequate  Airway patency: patent    PONV status at discharge: No PONV  Anesthetic complications: no      Cardiovascular status: hemodynamically stable  Respiratory status: unassisted and room air  Hydration status: euvolemic  Follow-up not needed.          Vitals Value Taken Time   /66 09/17/20 1626   Temp 36.4 °C (97.5 °F) 09/17/20 1626   Pulse 89 09/17/20 1713   Resp 16 09/17/20 1917   SpO2 96 % 09/17/20 1713         Event Time   Out of Recovery 16:20:00         Pain/Lida Score: Pain Rating Prior to Med Admin: 4 (9/17/2020  7:17 PM)  Lida Score: 9 (9/17/2020  4:15 PM)

## 2020-09-18 NOTE — PLAN OF CARE
I sent Dr. Hernandez a secure chat for subsequent HH orders to resume pts HH with [facility] . AVS updated. Viky Todd LCSW     I called [facility] at [phone] and confirmed the fax of [phone]. Per admissions the pt is active out of their [place] office however the fax is sufficient to get to her . Viky Todd LCSW       09/18/20 1200   Post-Acute Status   Post-Acute Authorization Home Health   Home Health Status Awaiting Orders for HH

## 2020-09-18 NOTE — PLAN OF CARE
The pt is discharging home and is resuming HH with [facility]. Viky Todd, [facility]     09/18/20 1248   Final Note   Assessment Type Final Discharge Note   Anticipated Discharge Disposition Home-Health

## 2020-09-18 NOTE — CARE UPDATE
09/18/20 0759   Patient Assessment/Suction   Level of Consciousness (AVPU) alert   Respiratory Effort Normal;Unlabored   Expansion/Accessory Muscles/Retractions no use of accessory muscles;no retractions;expansion symmetric   All Lung Fields Breath Sounds clear   Rhythm/Pattern, Respiratory unlabored;pattern regular;depth regular   Cough Frequency no cough   PRE-TX-O2   O2 Device (Oxygen Therapy) room air   SpO2 95 %   Pulse Oximetry Type Intermittent   $ Pulse Oximetry - Multiple Charge Pulse Oximetry - Multiple   Pulse 65   Resp 18   Incentive Spirometer   $ Incentive Spirometer Charges done with encouragement   Administration (IS) instruction provided, follow-up   Number of Repetitions (IS) 10   Level Incentive Spirometer (mL) 1250   Patient Tolerance (IS) good       Patient encouraged to do deep breathing exercises independently.

## 2020-09-18 NOTE — NURSING
Discharged patient in stable condition, transported via wheelchair accompanied by family and staff

## 2020-09-18 NOTE — DISCHARGE INSTRUCTIONS
Please clamp the suprapubic cath in Sunday and if you void without problem please call the office of Dr. Jain on Monday ( 9/21/20). If you have problem voiding please unclamped the knott catha and still call Dr. jain office on monday

## 2020-09-18 NOTE — NURSING
Went into do VS, and check knott drainage. Only 25 mL in catheter bag. Bladder scan done and max amount 24 mL found, patient states no discomfort. Encouraged to drink more water.

## 2020-09-18 NOTE — PLAN OF CARE
09/18/20 1333   MARINA Message   Medicare Outpatient and Observation Notification regarding financial responsibility Given to patient/caregiver;Explained to patient/caregiver;Signed/date by patient/caregiver   Date MARINA was signed 09/18/20   Time MARINA was signed [id number]

## 2020-09-18 NOTE — PLAN OF CARE
VSS. Afebrile. Suprapubic catheter draining, discomfort controlled with PRN medication, urine yellow to orange due to medication. Bladder scanned a few times last night and never greater than 59 at a time. IV flushed. O2 as needed, lingering medications from surgery. POC reviewed, call light in reach, will continue to monitor.

## 2020-09-18 NOTE — PLAN OF CARE
Discharge paper reviewed and copy given to pt.  RX x 1 given to pt. Education hand outs provided. Demonstrate with pt and pt son how to empty the knott cath. Verbalized understanding  IV access removed. No bleeding site in the IV site.

## 2020-09-21 ENCOUNTER — TELEPHONE (OUTPATIENT)
Dept: UROGYNECOLOGY | Facility: CLINIC | Age: 85
End: 2020-09-21

## 2020-09-21 ENCOUNTER — CLINICAL SUPPORT (OUTPATIENT)
Dept: UROGYNECOLOGY | Facility: CLINIC | Age: 85
End: 2020-09-21
Payer: MEDICARE

## 2020-09-21 DIAGNOSIS — Z46.6 ENCOUNTER FOR FOLEY CATHETER REMOVAL: Primary | ICD-10-CM

## 2020-09-21 PROCEDURE — 99499 UNLISTED E&M SERVICE: CPT | Mod: S$PBB,,, | Performed by: OBSTETRICS & GYNECOLOGY

## 2020-09-21 PROCEDURE — 99499 NO LOS: ICD-10-PCS | Mod: S$PBB,,, | Performed by: OBSTETRICS & GYNECOLOGY

## 2020-09-21 NOTE — TELEPHONE ENCOUNTER
Coming in to clinic tyo get supra pubic cath removed states that she has been urinating on her own since yesterday.

## 2020-09-21 NOTE — PROGRESS NOTES
Arrived to  Floor. Patient had removed knott catheter bag and had supra pubic cath clamped. Urine drained after voiding and only had a couple of drops. Tape and stiches removed and supra pubic pulled. Instructed to  Take showers for now no tub baths. States undertsanding and ambulated with Daughter to car for home.

## 2020-09-21 NOTE — TELEPHONE ENCOUNTER
----- Message from Higinio Coe sent at 9/21/2020  8:19 AM CDT -----  Type: Needs Medical Advice  Who Called:  Patient    Best Call Back Number: [phone]  Additional Information: Patient states that she would like a callback regarding scheduling a Nurse Visit for catheter removal for today if possible

## 2020-09-30 VITALS
HEART RATE: 69 BPM | RESPIRATION RATE: 19 BRPM | TEMPERATURE: 98 F | OXYGEN SATURATION: 91 % | SYSTOLIC BLOOD PRESSURE: 100 MMHG | DIASTOLIC BLOOD PRESSURE: 61 MMHG

## 2020-10-01 ENCOUNTER — OFFICE VISIT (OUTPATIENT)
Dept: UROGYNECOLOGY | Facility: CLINIC | Age: 85
End: 2020-10-01
Payer: MEDICARE

## 2020-10-01 VITALS
SYSTOLIC BLOOD PRESSURE: 146 MMHG | BODY MASS INDEX: 21.48 KG/M2 | DIASTOLIC BLOOD PRESSURE: 79 MMHG | HEART RATE: 76 BPM | HEIGHT: 67 IN | WEIGHT: 136.88 LBS

## 2020-10-01 DIAGNOSIS — Z09 POSTOP CHECK: ICD-10-CM

## 2020-10-01 DIAGNOSIS — R35.0 URINARY FREQUENCY: Primary | ICD-10-CM

## 2020-10-01 LAB
BILIRUB SERPL-MCNC: ABNORMAL MG/DL
BLOOD URINE, POC: ABNORMAL
CLARITY, POC UA: CLEAR
COLOR, POC UA: YELLOW
GLUCOSE UR QL STRIP: ABNORMAL
KETONES UR QL STRIP: ABNORMAL
LEUKOCYTE ESTERASE URINE, POC: ABNORMAL
NITRITE, POC UA: ABNORMAL
PH, POC UA: 5
PROTEIN, POC: ABNORMAL
SPECIFIC GRAVITY, POC UA: 1030
UROBILINOGEN, POC UA: ABNORMAL

## 2020-10-01 PROCEDURE — 99024 PR POST-OP FOLLOW-UP VISIT: ICD-10-PCS | Mod: POP,,, | Performed by: NURSE PRACTITIONER

## 2020-10-01 PROCEDURE — 81002 URINALYSIS NONAUTO W/O SCOPE: CPT | Mod: PBBFAC,PO | Performed by: NURSE PRACTITIONER

## 2020-10-01 PROCEDURE — 99999 PR PBB SHADOW E&M-EST. PATIENT-LVL IV: CPT | Mod: PBBFAC,,, | Performed by: NURSE PRACTITIONER

## 2020-10-01 PROCEDURE — 99999 PR PBB SHADOW E&M-EST. PATIENT-LVL IV: ICD-10-PCS | Mod: PBBFAC,,, | Performed by: NURSE PRACTITIONER

## 2020-10-01 PROCEDURE — 99024 POSTOP FOLLOW-UP VISIT: CPT | Mod: POP,,, | Performed by: NURSE PRACTITIONER

## 2020-10-01 PROCEDURE — 99214 OFFICE O/P EST MOD 30 MIN: CPT | Mod: PBBFAC,PO | Performed by: NURSE PRACTITIONER

## 2020-10-02 NOTE — OP NOTE
Discharge note; this patient was admitted for surgical therapy for procidentia and diffuse pelvic relaxation.  She underwent D and C to evaluate the endometrium and path report was benign based on minimal tissue obtained.  The procedure well she did well postoperatively and was discharged on pelvic rest decreased activities stool softener in a pain pill.  We counseled her on postop care in the issues of retaining the uterus which was done to keep the operative time as short as possible.  She is to follow up prior usual routine on less should problems arise and will return for catheter care as scheduled.

## 2020-10-02 NOTE — PROGRESS NOTES
Subjective:       Patient ID: Thu Cr is a 85 y.o. female.    Chief Complaint: 2 week post op      Thu Cr is a 85 y.o. female who is 2 weeks post op from colpocleisis and colporrhaphy.  D&C on 09/17/20 with Dr. Hernandez.  She feels that she is doing OK.  She denies any pain.  She does have some irritation that seems to be more on the left side and more internal.  She denies any bleeding, but will occasionally have a small amount of discharge.  This does not itch or bother her.  She has frequency x q 2hours at night and she tends to have UUI at night when she is getting up to urinate.  She has frequency x 8-9 during the day.  She denies any KRISTIN.  She denies any feeling of PVF.  She is doing ok with BM.  She has been taking it easy and overall feels that she is doing well.    Review of Systems   Constitutional: Negative for activity change, fever and unexpected weight change.   HENT: Negative for hearing loss.    Eyes: Negative for visual disturbance.   Respiratory: Negative for shortness of breath and wheezing.    Cardiovascular: Negative for chest pain, palpitations and leg swelling.   Gastrointestinal: Negative for abdominal pain, constipation and diarrhea.   Genitourinary: Positive for frequency and urgency. Negative for dyspareunia, dysuria, vaginal bleeding and vaginal discharge.   Musculoskeletal: Negative for gait problem and neck pain.   Skin: Negative for rash and wound.   Allergic/Immunologic: Negative for immunocompromised state.   Neurological: Negative for tremors, speech difficulty and weakness.   Hematological: Does not bruise/bleed easily.   Psychiatric/Behavioral: Negative for agitation and confusion.       Objective:      Physical Exam  Constitutional:       General: She is not in acute distress.     Appearance: She is well-developed.   HENT:      Head: Normocephalic and atraumatic.   Neck:      Musculoskeletal: Neck supple.      Thyroid: No thyromegaly.   Pulmonary:      Effort:  Pulmonary effort is normal. No respiratory distress.   Abdominal:      Palpations: Abdomen is soft.      Tenderness: There is no abdominal tenderness.      Hernia: No hernia is present.   Musculoskeletal: Normal range of motion.   Skin:     General: Skin is warm and dry.      Findings: No rash.   Neurological:      Mental Status: She is alert and oriented to person, place, and time.   Psychiatric:         Behavior: Behavior normal.       Pelvic Exam:  Deferred postop      Assessment:       1. Urinary frequency    2. Postop check        Plan:       Urinary frequency- monitor  -     POCT URINE DIPSTICK WITHOUT MICROSCOPE    Postop check- np reviewed post op limitations restrictions.  Pt verbalized understanding.    RTCC 4 weeks with Dr. Hernandez

## 2020-10-26 ENCOUNTER — OFFICE VISIT (OUTPATIENT)
Dept: UROGYNECOLOGY | Facility: CLINIC | Age: 85
End: 2020-10-26
Payer: MEDICARE

## 2020-10-26 VITALS
WEIGHT: 140 LBS | HEIGHT: 67 IN | BODY MASS INDEX: 21.97 KG/M2 | SYSTOLIC BLOOD PRESSURE: 144 MMHG | HEART RATE: 72 BPM | DIASTOLIC BLOOD PRESSURE: 83 MMHG

## 2020-10-26 DIAGNOSIS — Z09 POSTOP CHECK: Primary | ICD-10-CM

## 2020-10-26 PROCEDURE — 99024 POSTOP FOLLOW-UP VISIT: CPT | Mod: S$PBB,,, | Performed by: OBSTETRICS & GYNECOLOGY

## 2020-10-26 PROCEDURE — 99024 PR POST-OP FOLLOW-UP VISIT: ICD-10-PCS | Mod: S$PBB,,, | Performed by: OBSTETRICS & GYNECOLOGY

## 2020-10-26 PROCEDURE — 99999 PR PBB SHADOW E&M-EST. PATIENT-LVL III: ICD-10-PCS | Mod: PBBFAC,,, | Performed by: OBSTETRICS & GYNECOLOGY

## 2020-10-26 PROCEDURE — 81002 URINALYSIS NONAUTO W/O SCOPE: CPT | Mod: PBBFAC,PO | Performed by: OBSTETRICS & GYNECOLOGY

## 2020-10-26 PROCEDURE — 99999 PR PBB SHADOW E&M-EST. PATIENT-LVL III: CPT | Mod: PBBFAC,,, | Performed by: OBSTETRICS & GYNECOLOGY

## 2020-10-26 PROCEDURE — 99213 OFFICE O/P EST LOW 20 MIN: CPT | Mod: PBBFAC,PO | Performed by: OBSTETRICS & GYNECOLOGY

## 2020-10-26 NOTE — PROGRESS NOTES
Subjective:     Chief Complaint:  Postop Visit  History of Present Illness: Thu Cr is a 85 y.o. female who presents for 6 weeks postop visit from colpocleisis.  She had endometrial sampling which showed inactive endometrium.  She is doing fine from a surgical standpoint.  She felt some mild irritation of the left introitus.  She is discontinued her Myrbetriq and is complaining of frequency and nocturia.  She had some improvement with Myrbetriq preoperatively and had side effects from anticholinergic agents in the past.    Review of Systems    We discussed avoiding constipation coughing and sneezing       Objective:   Good anatomic result, healing well with no discharge or significant abnormalities note    Assessment:   Good anatomic result  Persistent overactive bladder       Plan:      Myrbetriq

## 2020-11-19 ENCOUNTER — TELEPHONE (OUTPATIENT)
Dept: UROGYNECOLOGY | Facility: CLINIC | Age: 85
End: 2020-11-19

## 2020-11-19 NOTE — TELEPHONE ENCOUNTER
Called and spoke to pt and  She states the myrbertriq she can not afford the 60.00 a month on her limited income and was wanting to know if there was something else that is not too expensive that medicare will cover?

## 2020-11-19 NOTE — TELEPHONE ENCOUNTER
----- Message from Kash Wu sent at 11/19/2020  8:48 AM CST -----  Type: Needs Medical Advice    Who Called:  Patient  Best Call Back Number: [phone]  Additional Information: Patient would like to discuss changing medications. Please call to advise. Thanks!

## 2020-11-23 RX ORDER — SOLIFENACIN SUCCINATE 5 MG/1
5 TABLET, FILM COATED ORAL DAILY
Qty: 30 TABLET | Refills: 12 | Status: SHIPPED | OUTPATIENT
Start: 2020-11-23 | End: 2021-07-12

## 2020-11-24 ENCOUNTER — TELEPHONE (OUTPATIENT)
Dept: UROGYNECOLOGY | Facility: CLINIC | Age: 85
End: 2020-11-24

## 2020-11-24 RX ORDER — OXYBUTYNIN CHLORIDE 5 MG/1
5 TABLET ORAL
COMMUNITY
End: 2021-07-12

## 2020-11-24 NOTE — TELEPHONE ENCOUNTER
Spoke to pharmacy and the vesicare is not on formulary , it tells her that Ditropan and toviaz are. Please advise?

## 2020-11-24 NOTE — TELEPHONE ENCOUNTER
Called  Pharmacy and they said that the co pay for the Toviaz was 50.00. Oxybutynin was 92 cents. Is going to try the  oxybutynin for now.

## 2020-11-24 NOTE — TELEPHONE ENCOUNTER
----- Message from Kash Wu sent at 11/24/2020  9:50 AM CST -----  Type:  Pharmacy Calling to Clarify an RX    Name of Caller:  Bea ([facility])  Pharmacy Name:    [facility] [address] [address] [facility] [address]  Phone: [phone] Fax: [phone]  Prescription Name:  solifenacin (VESICARE) 5 MG tablet and mirabegron (MYRBETRIQ) 50 mg Tb24  What do they need to clarify?:  vesicare is not covered by insurance, originally prescribbed mybretriq before the change to vesicare  Best Call Back Number:  [phone]  Additional Information:  NA

## 2020-11-24 NOTE — TELEPHONE ENCOUNTER
Her concern with Myrbetriq was the cost(60 dollars per month)  Will find out what the pharmacy is going to charge her for Toviaz 4 mg once a day #30 with 11 refills verses oxybutynin 5 mg twice a day #60 with 11 refills.  I would prefer the Toviaz because I do not think she will tolerate the oxybutynin but she is not going to pay very much for the prescription

## 2020-11-24 NOTE — TELEPHONE ENCOUNTER
Called and informed that Dr jain had sent in the generic for vesicare and that the computer states that it should be no more than 18.00 a month . But to double check with the pharmacy.

## 2021-03-22 PROBLEM — N63.21 MASS OF UPPER OUTER QUADRANT OF LEFT BREAST: Status: ACTIVE | Noted: 2021-03-22

## 2021-04-06 PROBLEM — C50.912 INVASIVE DUCTAL CARCINOMA OF BREAST, LEFT: Status: ACTIVE | Noted: 2021-04-06

## 2021-04-23 ENCOUNTER — CLINICAL SUPPORT (OUTPATIENT)
Dept: URGENT CARE | Facility: CLINIC | Age: 86
End: 2021-04-23
Payer: MEDICARE

## 2021-04-23 DIAGNOSIS — C50.912 INVASIVE DUCTAL CARCINOMA OF BREAST, FEMALE, LEFT: ICD-10-CM

## 2021-04-23 DIAGNOSIS — Z01.818 PRE-OP TESTING: Primary | ICD-10-CM

## 2021-04-23 PROCEDURE — U0003 INFECTIOUS AGENT DETECTION BY NUCLEIC ACID (DNA OR RNA); SEVERE ACUTE RESPIRATORY SYNDROME CORONAVIRUS 2 (SARS-COV-2) (CORONAVIRUS DISEASE [COVID-19]), AMPLIFIED PROBE TECHNIQUE, MAKING USE OF HIGH THROUGHPUT TECHNOLOGIES AS DESCRIBED BY CMS-2020-01-R: HCPCS | Performed by: SURGERY

## 2021-04-26 LAB — SARS-COV-2 RNA RESP QL NAA+PROBE: NOT DETECTED

## 2021-05-12 ENCOUNTER — DOCUMENTATION ONLY (OUTPATIENT)
Dept: ADMINISTRATIVE | Facility: OTHER | Age: 86
End: 2021-05-12

## 2021-07-12 PROBLEM — Z86.16 HISTORY OF 2019 NOVEL CORONAVIRUS DISEASE (COVID-19): Status: ACTIVE | Noted: 2021-07-12

## (undated) DEVICE — CATH URETHRAL 16FR RED

## (undated) DEVICE — SET EXTENSION STERILE 30IN

## (undated) DEVICE — SEE MEDLINE ITEM 157116

## (undated) DEVICE — LUBRICANT SURGILUBE 2 OZ

## (undated) DEVICE — NDL SAFETY 25G X 1.5 ECLIPSE

## (undated) DEVICE — SCRUB 10% POVIDONE IODINE 4OZ

## (undated) DEVICE — SPONGE DERMACEA GAUZE 4X4

## (undated) DEVICE — SUT 2-0 VICRYL / CT-1

## (undated) DEVICE — CATH BONANO SUPRAPUBIC

## (undated) DEVICE — SEE MEDLINE ITEM 152622

## (undated) DEVICE — SEE MEDLINE ITEM 157131

## (undated) DEVICE — SOL PVP-I SCRUB 7.5% 4OZ

## (undated) DEVICE — SUT ETHILON 2-0 FS 18IN BLK

## (undated) DEVICE — PACK LAPSCP/PELVSCPY III TIBRN

## (undated) DEVICE — SLEEVE SCD EXPRESS CALF MEDIUM

## (undated) DEVICE — SYR 10CC LUER LOCK

## (undated) DEVICE — SEE MEDLINE ITEM 157181

## (undated) DEVICE — SUT 1 36IN PDS II

## (undated) DEVICE — SYS LABEL CORRECT MED

## (undated) DEVICE — ELECTRODE REM PLYHSV RETURN 9

## (undated) DEVICE — TRAY DRY SPONGE SCRUB W FOAM

## (undated) DEVICE — PAD OB HS-77 STRL PREPACK 12S

## (undated) DEVICE — GLOVE SURG ULTRA TOUCH 7.5

## (undated) DEVICE — SOL 9P NACL IRR PIC IL

## (undated) DEVICE — PACK CUSTOM UNIV BASIN SLI

## (undated) DEVICE — Device